# Patient Record
Sex: MALE | Race: BLACK OR AFRICAN AMERICAN | Employment: OTHER | URBAN - METROPOLITAN AREA
[De-identification: names, ages, dates, MRNs, and addresses within clinical notes are randomized per-mention and may not be internally consistent; named-entity substitution may affect disease eponyms.]

---

## 2017-03-07 ENCOUNTER — OFFICE VISIT (OUTPATIENT)
Dept: FAMILY MEDICINE CLINIC | Age: 67
End: 2017-03-07

## 2017-03-07 DIAGNOSIS — N18.2 CKD (CHRONIC KIDNEY DISEASE) STAGE 2, GFR 60-89 ML/MIN: ICD-10-CM

## 2017-03-07 DIAGNOSIS — E11.21 TYPE 2 DIABETES MELLITUS WITH DIABETIC NEPHROPATHY, WITH LONG-TERM CURRENT USE OF INSULIN (HCC): Primary | ICD-10-CM

## 2017-03-07 DIAGNOSIS — E78.2 MIXED HYPERLIPIDEMIA: ICD-10-CM

## 2017-03-07 DIAGNOSIS — Z79.4 TYPE 2 DIABETES MELLITUS WITH DIABETIC NEPHROPATHY, WITH LONG-TERM CURRENT USE OF INSULIN (HCC): Primary | ICD-10-CM

## 2017-03-07 DIAGNOSIS — I10 ESSENTIAL HYPERTENSION, BENIGN: ICD-10-CM

## 2017-03-14 RX ORDER — BLOOD SUGAR DIAGNOSTIC
STRIP MISCELLANEOUS
Qty: 300 STRIP | Refills: 0 | Status: SHIPPED | OUTPATIENT
Start: 2017-03-14

## 2017-04-17 ENCOUNTER — HOSPITAL ENCOUNTER (OUTPATIENT)
Dept: LAB | Age: 67
Discharge: HOME | End: 2017-04-17
Payer: MEDICARE

## 2017-04-17 DIAGNOSIS — E11.29 TYPE II DIABETES MELLITUS WITH RENAL MANIFESTATIONS (HCC): ICD-10-CM

## 2017-04-17 LAB
ALBUMIN SERPL BCP-MCNC: 3.6 G/DL (ref 3.4–5)
ANION GAP BLD CALC-SCNC: 8 MMOL/L (ref 3–18)
BUN SERPL-MCNC: 24 MG/DL (ref 7–18)
BUN/CREAT SERPL: 14 (ref 12–20)
CALCIUM SERPL-MCNC: 8.8 MG/DL (ref 8.5–10.1)
CALCIUM SERPL-MCNC: 9.1 MG/DL (ref 8.5–10.1)
CHLORIDE SERPL-SCNC: 97 MMOL/L (ref 100–108)
CO2 SERPL-SCNC: 31 MMOL/L (ref 21–32)
CREAT SERPL-MCNC: 1.69 MG/DL (ref 0.6–1.3)
CREAT UR-MCNC: 91.6 MG/DL (ref 30–125)
GLUCOSE SERPL-MCNC: 188 MG/DL (ref 74–99)
PHOSPHATE SERPL-MCNC: 3.5 MG/DL (ref 2.5–4.9)
POTASSIUM SERPL-SCNC: 3.7 MMOL/L (ref 3.5–5.5)
PROT UR-MCNC: 9 MG/DL
PTH-INTACT SERPL-MCNC: 73.9 PG/ML (ref 14–72)
SODIUM SERPL-SCNC: 136 MMOL/L (ref 136–145)

## 2017-04-17 PROCEDURE — 82570 ASSAY OF URINE CREATININE: CPT | Performed by: INTERNAL MEDICINE

## 2017-04-17 PROCEDURE — 83970 ASSAY OF PARATHORMONE: CPT | Performed by: INTERNAL MEDICINE

## 2017-04-17 PROCEDURE — 84156 ASSAY OF PROTEIN URINE: CPT | Performed by: INTERNAL MEDICINE

## 2017-04-17 PROCEDURE — 36415 COLL VENOUS BLD VENIPUNCTURE: CPT | Performed by: INTERNAL MEDICINE

## 2017-04-17 PROCEDURE — 80069 RENAL FUNCTION PANEL: CPT | Performed by: INTERNAL MEDICINE

## 2017-06-12 ENCOUNTER — OFFICE VISIT (OUTPATIENT)
Dept: FAMILY MEDICINE CLINIC | Age: 67
End: 2017-06-12

## 2017-06-12 ENCOUNTER — PATIENT OUTREACH (OUTPATIENT)
Dept: FAMILY MEDICINE CLINIC | Age: 67
End: 2017-06-12

## 2017-06-12 VITALS
TEMPERATURE: 98.5 F | WEIGHT: 242.4 LBS | DIASTOLIC BLOOD PRESSURE: 86 MMHG | BODY MASS INDEX: 30.14 KG/M2 | SYSTOLIC BLOOD PRESSURE: 136 MMHG | RESPIRATION RATE: 26 BRPM | HEIGHT: 75 IN | OXYGEN SATURATION: 97 % | HEART RATE: 71 BPM

## 2017-06-12 DIAGNOSIS — E11.21 TYPE 2 DIABETES MELLITUS WITH DIABETIC NEPHROPATHY, WITH LONG-TERM CURRENT USE OF INSULIN (HCC): ICD-10-CM

## 2017-06-12 DIAGNOSIS — I10 ESSENTIAL HYPERTENSION, BENIGN: Primary | ICD-10-CM

## 2017-06-12 DIAGNOSIS — R97.20 ELEVATED PSA: ICD-10-CM

## 2017-06-12 DIAGNOSIS — Z00.00 ROUTINE GENERAL MEDICAL EXAMINATION AT A HEALTH CARE FACILITY: ICD-10-CM

## 2017-06-12 DIAGNOSIS — N52.9 ERECTILE DYSFUNCTION, UNSPECIFIED ERECTILE DYSFUNCTION TYPE: ICD-10-CM

## 2017-06-12 DIAGNOSIS — Z13.39 SCREENING FOR ALCOHOLISM: ICD-10-CM

## 2017-06-12 DIAGNOSIS — E78.2 MIXED HYPERLIPIDEMIA: ICD-10-CM

## 2017-06-12 DIAGNOSIS — N18.2 CKD (CHRONIC KIDNEY DISEASE) STAGE 2, GFR 60-89 ML/MIN: ICD-10-CM

## 2017-06-12 DIAGNOSIS — Z79.4 TYPE 2 DIABETES MELLITUS WITH DIABETIC NEPHROPATHY, WITH LONG-TERM CURRENT USE OF INSULIN (HCC): ICD-10-CM

## 2017-06-12 RX ORDER — TADALAFIL 20 MG/1
20 TABLET ORAL AS NEEDED
Qty: 6 TAB | Refills: 10 | Status: SHIPPED | OUTPATIENT
Start: 2017-06-12 | End: 2018-01-12 | Stop reason: SDUPTHER

## 2017-06-12 RX ORDER — CARVEDILOL 12.5 MG/1
TABLET ORAL
Refills: 0 | COMMUNITY
Start: 2017-04-26 | End: 2020-10-02

## 2017-06-12 RX ORDER — PHENTERMINE HYDROCHLORIDE 15 MG/1
CAPSULE ORAL
Refills: 3 | COMMUNITY
Start: 2017-05-30 | End: 2018-05-17 | Stop reason: ALTCHOICE

## 2017-06-12 NOTE — PROGRESS NOTES
This is a Subsequent Medicare Annual Wellness Visit providing Personalized Prevention Plan Services (PPPS) (Performed 12 months after initial AWV and PPPS )    I have reviewed the patient's medical history in detail and updated the computerized patient record. History     Past Medical History:   Diagnosis Date    Allergic rhinitis, cause unspecified 2/12/2013    CKD (chronic kidney disease) stage 2, GFR 60-89 ml/min 11/3/2016    Dysuria     ED (erectile dysfunction) 12/2/2011    Elevated PSA     benign PNBx 2012, 1/6/2016    Encounter for long-term (current) use of other medications 1/9/2011    Essential hypertension, benign 1/9/2011    Gross hematuria     Hemorrhagic cystitis     Mixed hyperlipidemia 1/9/2011    Nocturia 10/10/2011    OAB (overactive bladder)     Orchitis     Type II or unspecified type diabetes mellitus without mention of complication, not stated as uncontrolled 1/9/2011    UTI (urinary tract infection)       Past Surgical History:   Procedure Laterality Date    HX HEMORRHOIDECTOMY      HX HERNIA REPAIR      HX ORTHOPAEDIC      HX UROLOGICAL  05/15/12    [facility], PNBx - TRUS Vol 23.22 cc's, Benign, Dr Lukasz Worthington  1/6/16    PNBx-TRUS VOL 20.06cc's, Benign, pre-Bx PSA 10.07, Dr. Tyler Joiner     Current Outpatient Prescriptions   Medication Sig Dispense Refill    carvedilol (COREG) 12.5 mg tablet TK 1 T PO  BID  0    phentermine (ADIPEX_P) 15 mg capsule TK 1 C PO ONCE QD  3    tadalafil (CIALIS) 20 mg tablet Take 1 Tab by mouth as needed. 6 Tab 10    ONETOUCH ULTRA TEST strip TEST FOUR TIMES DAILY AS DIRECTED 300 Strip 0    chlorthalidone (HYGROTEN) 25 mg tablet TK 1 T PO D  11    glucose blood VI test strips (ONETOUCH ULTRA TEST) strip by Does Not Apply route See Admin Instructions.  Dx. Code E11.9 300 Strip 3    NOVOLOG MIX 70-30 FLEXPEN 100 unit/mL (70-30) inpn       SRIDEVI PEN NEEDLE 32 gauge x 5/32\" ndle   6    quinapril (ACCUPRIL) 40 mg tablet Take 2 Tabs by mouth daily. (Patient taking differently: Take 40 mg by mouth daily.) 60 Tab 11    esomeprazole (NEXIUM) 20 mg capsule Take  by mouth daily.  Blood-Glucose Meter monitoring kit Test 4-5 times per day as needed 1 Kit 0    glucose blood VI test strips (BLOOD GLUCOSE TEST) strip Test 4-5 times per day as needed 150 Strip 11    COMBIGAN 0.2-0.5 % drop ophthalmic solution   3    triamcinolone acetonide (KENALOG) 0.1 % topical cream Apply  to affected area two (2) times a day. use thin layer 85 g 1     No Known Allergies  Family History   Problem Relation Age of Onset    Diabetes Mother     Cancer Father      Social History   Substance Use Topics    Smoking status: Former Smoker    Smokeless tobacco: Not on file    Alcohol use No     Patient Active Problem List   Diagnosis Code    Essential hypertension, benign I10    Mixed hyperlipidemia E78.2    Encounter for long-term (current) use of other medications Z79.899    Nocturia R35.1    ED (erectile dysfunction) N52.9    Allergic rhinitis, cause unspecified J30.9    Elevated PSA R97.20    OAB (overactive bladder) N32.81    Type 2 diabetes mellitus with diabetic nephropathy, with long-term current use of insulin (HCC) E11.21, Z79.4    CKD (chronic kidney disease) stage 2, GFR 60-89 ml/min N18.2       Depression Risk Factor Screening:     PHQ over the last two weeks 11/3/2016   Little interest or pleasure in doing things Not at all   Feeling down, depressed or hopeless Not at all   Total Score PHQ 2 0     Alcohol Risk Factor Screening: On any occasion during the past 3 months, have you had more than 4 drinks containing alcohol? No    Do you average more than 14 drinks per week? No      Functional Ability and Level of Safety:     Hearing Loss   mild-to-moderate    Activities of Daily Living   Self-care. Requires assistance with: no ADLs    Fall Risk     Fall Risk Assessment, last 12 mths 11/3/2016   Able to walk?  Yes   Fall in past 15 months? No     Abuse Screen   Patient is not abused    Review of Systems   Not required    Physical Examination     Evaluation of Cognitive Function:  Mood/affect:  neutral  Appearance: age appropriate  Family member/caregiver input: here alone    No exam performed today, done by Dr David Ashley. Patient Care Team:  Marcellus Cole MD as PCP - WESLEY Turcios MD (Ophthalmology)   Tapan Owen RN Nurse Navigator    Advice/Referrals/Counseling   Education and counseling provided:  End-of-Life planning (with patient's consent) Advanced Directives discussed with patient, given Your Right to Decide booklet and Advanced Directive paperwork to completed and bring back for chart. Assessment/Plan   As directed by Dr David Ashley.

## 2017-06-12 NOTE — PATIENT INSTRUCTIONS
Medicare Part B Preventive Services Limitations Recommendation Scheduled   Bone Mass Measurement  (age 72 & older, biennial) Requires diagnosis related to osteoporosis or estrogen deficiency. Biennial benefit unless patient has history of long-term glucocorticoid tx or baseline is needed because initial test was by other method  N/A   Cardiovascular Screening Blood Tests (every 5 years)  Total cholesterol, HDL, Triglycerides Order as a panel if possible  Last done 11/2016   Colorectal Cancer Screening  -Fecal occult blood test (annual)  -Flexible sigmoidoscopy (5y)  -Screening colonoscopy (10y)  -Barium Enema   Referred last visit   Counseling to Prevent Tobacco Use (up to 8 sessions per year)  - Counseling greater than 3 and up to 10 minutes  - Counseling greater than 10 minutes Patients must be asymptomatic of tobacco-related conditions to receive as preventive service  Non smoker   Diabetes Screening Tests (at least every 3 years, Medicare covers annually or at 6-month intervals for prediabetic patients)    Fasting blood sugar (FBS) or glucose tolerance test (GTT) Patient must be diagnosed with one of the following:  -Hypertension, Dyslipidemia, obesity, previous impaired FBS or GTT  Or any two of the following: overweight, FH of diabetes, age ? 72, history of gestational diabetes, birth of baby weighing more than 9 pounds  Last one 5/2017 7.9   Diabetes Self-Management Training (DSMT) (no USPSTF recommendation) Requires referral by treating physician for patient with diabetes or renal disease. 10 hours of initial DSMT session of no less than 30 minutes each in a continuous 12-month period. 2 hours of follow-up DSMT in subsequent years.   N/A   Glaucoma Screening (no USPSTF recommendation) Diabetes mellitus, family history, , age 48 or over,  American, age 72 or over  Eye surgery with Dr Christa Boss this week   Human Immunodeficiency Virus (HIV) Screening (annually for increased risk patients)  HIV-1 and HIV-2 by EIA, MAYITO, rapid antibody test, or oral mucosa transudate Patient must be at increased risk for HIV infection per USPSTF guidelines or pregnant. Tests covered annually for patients at increased risk. Pregnant patients may receive up to 3 test during pregnancy. Not high risk   Medical Nutrition Therapy (MNT) (for diabetes or renal disease not recommended schedule) Requires referral by treating physician for patient with diabetes or renal disease. Can be provided in same year as diabetes self-management training (DSMT), and CMS recommends medical nutrition therapy take place after DSMT. Up to 3 hours for initial year and 2 hours in subsequent years. N/A   Prostate Cancer Screening (annually up to age 76)  - Digital rectal exam (AVA)  - Prostate specific antigen (PSA) Annually (age 48 or over), AVA not paid separately when covered E/M service is provided on same date  Men up to age 76 may need a screening blood test for prostate cancer at certain intervals, depending on their personal and family history. This decision is between the patient and his provider. Last PSA was 10.07 on 12/2015 being followed by Fadi Alexander  Urology   Seasonal Influenza Vaccination (annually)   Fall 2017     Pneumococcal Vaccination (once after 72)      Hepatitis B Vaccinations (if medium/high risk) Medium/high risk factors:  End-stage renal disease,  Hemophiliacs who received Factor VIII or IX concentrates, Clients of institutions for the mentally retarded, Persons who live in the same house as a HepB virus carrier, Homosexual men, Illicit injectable drug abusers. Done 3/2016   Shingles Vaccination A shingles vaccine is also recommended once in a lifetime after age 61  Declined today   Ultrasound Screening for Abdominal Aortic Aneurysm (AAA) (once) Patient must be referred through [facility] and not have had a screening for abdominal aortic aneurysm before under Medicare.   Limited to patients who meet one of the following criteria:  - Men who are 73-68 years old and have smoked more than 100 cigarettes in their lifetime.  -Anyone with a FH of AAA  -Anyone recommended for screening by USPSTF  N/A

## 2017-06-12 NOTE — MR AVS SNAPSHOT
Visit Information Date & Time Provider Department Dept. Phone Encounter #  
 6/12/2017  2:00 PM [facility] [phone] [id number] Follow-up Instructions Return in about 3 months (around 9/12/2017). Upcoming Health Maintenance Date Due FOBT Q 1 YEAR AGE 50-75 4/17/2000 ZOSTER VACCINE AGE 60> 4/17/2010 EYE EXAM RETINAL OR DILATED Q1 7/22/2016 MEDICARE YEARLY EXAM 3/12/2017 FOOT EXAM Q1 7/5/2017 GLAUCOMA SCREENING Q2Y 7/22/2017 INFLUENZA AGE 9 TO ADULT 8/1/2017 HEMOGLOBIN A1C Q6M 9/1/2017 Pneumococcal 65+ Low/Medium Risk (2 of 2 - PPSV23) 10/11/2017 LIPID PANEL Q1 3/1/2018 MICROALBUMIN Q1 3/3/2018 DTaP/Tdap/Td series (2 - Td) 7/5/2026 Allergies as of 6/12/2017  Review Complete On: 6/12/2017 By: Maycol Youssef No Known Allergies Current Immunizations  Reviewed on 11/3/2016 Name Date Influenza High Dose Vaccine PF 11/3/2016 Influenza Vaccine Split 10/11/2012 Pneumococcal Conjugate (PCV-13) 7/30/2015 Pneumococcal Vaccine (Unspecified Type) 10/11/2012 Not reviewed this visit You Were Diagnosed With   
  
 Codes Comments Essential hypertension, benign    -  Primary ICD-10-CM: I10 
ICD-9-CM: 401.1 Type 2 diabetes mellitus with diabetic nephropathy, with long-term current use of insulin (HCC)     ICD-10-CM: E11.21, Z79.4 ICD-9-CM: 250.40, 583.81, V58.67 Elevated PSA     ICD-10-CM: R97.20 ICD-9-CM: 790.93   
 CKD (chronic kidney disease) stage 2, GFR 60-89 ml/min     ICD-10-CM: N18.2 ICD-9-CM: 509. 2 Mixed hyperlipidemia     ICD-10-CM: E78.2 ICD-9-CM: 272.2 Erectile dysfunction, unspecified erectile dysfunction type     ICD-10-CM: N52.9 ICD-9-CM: 607.84 Vitals BP Pulse Temp Resp Height(growth percentile) Weight(growth percentile)  136/86 (BP 1 Location: Left arm, BP Patient Position: Sitting) 71 98.5 °F (36.9 °C) (Oral) 26 6' 2.5\" (1.892 m) 242 lb 6.4 oz (110 kg) SpO2 BMI Smoking Status 97% 30.71 kg/m2 Former Smoker Vitals History BMI and BSA Data Body Mass Index Body Surface Area 30.71 kg/m 2 2.4 m 2 Preferred Pharmacy Pharmacy Name Phone [facility] [address], [address] [address] [phone] Your Updated Medication List  
  
   
This list is accurate as of: 6/12/17  2:23 PM.  Always use your most recent med list.  
  
  
  
  
 Blood-Glucose Meter monitoring kit Test 4-5 times per day as needed  
  
 carvedilol 12.5 mg tablet Commonly known as:  COREG  
TK 1 T PO  BID  
  
 chlorthalidone 25 mg tablet Commonly known as:  HYGROTEN  
TK 1 T PO D  
  
 COMBIGAN 0.2-0.5 % Drop ophthalmic solution Generic drug:  brimonidine-timolol * glucose blood VI test strips strip Commonly known as:  blood glucose test  
Test 4-5 times per day as needed * glucose blood VI test strips strip Commonly known as:  ONETOUCH ULTRA TEST  
by Does Not Apply route See Admin Instructions. Dx. Code E11.9  
  
 * ONETOUCH ULTRA TEST strip Generic drug:  glucose blood VI test strips TEST FOUR TIMES DAILY AS DIRECTED Marielena Pen Needle 32 gauge x 5/32\" Ndle Generic drug:  Insulin Needles (Disposable) NexIUM 20 mg capsule Generic drug:  esomeprazole Take  by mouth daily. NovoLOG Mix 70-30 FlexPen 100 unit/mL (70-30) Inpn Generic drug:  insulin aspart protamine/insulin aspart  
  
 phentermine 15 mg capsule Commonly known as:  ADIPEX_P TK 1 C PO ONCE QD  
  
 quinapril 40 mg tablet Commonly known as:  ACCUPRIL Take 2 Tabs by mouth daily. tadalafil 20 mg tablet Commonly known as:  CIALIS Take 1 Tab by mouth as needed. triamcinolone acetonide 0.1 % topical cream  
Commonly known as:  KENALOG Apply  to affected area two (2) times a day. use thin layer * Notice: This list has 3 medication(s) that are the same as other medications prescribed for you. Read the directions carefully, and ask your doctor or other care provider to review them with you. Prescriptions Printed Refills  
 tadalafil (CIALIS) 20 mg tablet 10 Sig: Take 1 Tab by mouth as needed. Class: Print Route: Oral  
  
We Performed the Following AMB POC GLUCOSE, QUANTITATIVE, BLOOD [19073 CPT(R)] METABOLIC PANEL, COMPREHENSIVE [72948 CPT(R)] PROSTATE SPECIFIC AG (PSA) [id number] CPT(R)] Follow-up Instructions Return in about 3 months (around 9/12/2017). Introducing [facility] & HEALTH SERVICES! Dear Declan Cifuentes: 
Thank you for requesting a BuildOut account. Our records indicate that you have previously registered for a BuildOut account but its currently inactive. Please call our BuildOut support line at [phone]. Additional Information If you have questions, please visit the Frequently Asked Questions section of the BuildOut website at [email or web address] Remember, BuildOut is NOT to be used for urgent needs. For medical emergencies, dial 911. Now available from your iPhone and Android! Please provide this summary of care documentation to your next provider. Your primary care clinician is listed as Cindi Zuniga. If you have any questions after today's visit, please call [phone].

## 2017-06-12 NOTE — PROGRESS NOTES
HISTORY OF PRESENT ILLNESS  Neha Brenner is a 79 y.o. male. f/u hbp,ckd,dm2. Folowed by endo and renal.Latest AIC was 7.9,lipids good  Hypertension    The history is provided by the patient. This is a chronic problem. The problem has not changed since onset. Pertinent negatives include no orthopnea, no malaise/fatigue, no peripheral edema, no dizziness and no shortness of breath. Diabetes   This is a chronic problem. The problem occurs daily. The problem has not changed since onset. Pertinent negatives include no shortness of breath. Cholesterol Problem   This is a chronic problem. The problem occurs daily. The problem has not changed since onset. Pertinent negatives include no shortness of breath. Review of Systems   Constitutional: Negative for fever, malaise/fatigue and weight loss. Respiratory: Negative for shortness of breath. Cardiovascular: Negative for orthopnea and claudication. Genitourinary: Negative for dysuria, frequency and urgency. Skin: Negative for rash. Neurological: Negative for dizziness. Physical Exam   Constitutional: He appears well-developed and well-nourished. HENT:   Head: Normocephalic and atraumatic. Right Ear: External ear normal.   Left Ear: External ear normal.   Nose: Nose normal.   Mouth/Throat: Oropharynx is clear and moist.   Eyes: Conjunctivae are normal. Pupils are equal, round, and reactive to light. Neck: Normal range of motion. Neck supple. No tracheal deviation present. No thyromegaly present. Cardiovascular: Normal rate, regular rhythm and normal heart sounds. Exam reveals no gallop. No murmur heard. Pulmonary/Chest: Effort normal and breath sounds normal. No respiratory distress. He has no wheezes. Abdominal: Soft. Bowel sounds are normal. He exhibits no distension. Lymphadenopathy:     He has no cervical adenopathy. Neurological: He is alert. Skin: Skin is warm and dry. Psychiatric: He has a normal mood and affect.    Vitals reviewed. ASSESSMENT and Reynaanshu Riggins was seen today for diabetes, hypertension, cholesterol problem, mole, shortness of breath and annual wellness visit. Diagnoses and all orders for this visit:    Essential hypertension, benign  -     METABOLIC PANEL, COMPREHENSIVE    Type 2 diabetes mellitus with diabetic nephropathy, with long-term current use of insulin ([facility]),followed by Endo,doing well  -     AMB POC GLUCOSE, QUANTITATIVE, BLOOD    Elevated PSA  -     PROSTATE SPECIFIC AG    CKD (chronic kidney disease) stage 2, GFR 60-89 ml/min,followed by Renal    Mixed hyperlipidemia    Erectile dysfunction, unspecified erectile dysfunction type  -     tadalafil (CIALIS) 20 mg tablet; Take 1 Tab by mouth as needed. Routine general medical examination at a health care facility    Screening for alcoholism    Doing well,continue current meds and treatments    Follow-up Disposition:  Return in about 3 months (around 9/12/2017).

## 2017-06-12 NOTE — PROGRESS NOTES
Chief Complaint   Patient presents with    Diabetes     F/U on diabetes.  Hypertension     F/U on BP.  Cholesterol Problem     F/U on cholesterol.  Mole     Pt getting mole removed for R breast.    Shortness of Breath     Pt statee he has SOB and wheezing and requesting a chest X-Ray.

## 2017-11-28 ENCOUNTER — HOSPITAL ENCOUNTER (OUTPATIENT)
Dept: LAB | Age: 67
Discharge: HOME | End: 2017-11-28
Payer: MEDICARE

## 2017-11-28 LAB
ALBUMIN SERPL-MCNC: 3.8 G/DL (ref 3.4–5)
ALBUMIN/GLOB SERPL: 0.9 {RATIO} (ref 0.8–1.7)
ALP SERPL-CCNC: 81 U/L (ref 45–117)
ALT SERPL-CCNC: 32 U/L (ref 16–61)
AST SERPL-CCNC: 25 U/L (ref 15–37)
BILIRUB DIRECT SERPL-MCNC: 0.1 MG/DL (ref 0–0.2)
BILIRUB SERPL-MCNC: 0.3 MG/DL (ref 0.2–1)
GLOBULIN SER CALC-MCNC: 4.1 G/DL (ref 2–4)
PROT SERPL-MCNC: 7.9 G/DL (ref 6.4–8.2)

## 2017-11-28 PROCEDURE — 36415 COLL VENOUS BLD VENIPUNCTURE: CPT | Performed by: INTERNAL MEDICINE

## 2017-11-28 PROCEDURE — 80076 HEPATIC FUNCTION PANEL: CPT | Performed by: INTERNAL MEDICINE

## 2017-12-26 ENCOUNTER — HOSPITAL ENCOUNTER (OUTPATIENT)
Dept: LAB | Age: 67
Discharge: HOME | End: 2017-12-26
Payer: MEDICARE

## 2017-12-26 DIAGNOSIS — E11.29 TYPE II OR UNSPECIFIED TYPE DIABETES MELLITUS WITH RENAL MANIFESTATIONS, NOT STATED AS UNCONTROLLED(250.40) (HCC): ICD-10-CM

## 2017-12-26 LAB
ALBUMIN SERPL-MCNC: 3.9 G/DL (ref 3.4–5)
ANION GAP SERPL CALC-SCNC: 6 MMOL/L (ref 3–18)
BUN SERPL-MCNC: 20 MG/DL (ref 7–18)
BUN/CREAT SERPL: 13 (ref 12–20)
CALCIUM SERPL-MCNC: 8.7 MG/DL (ref 8.5–10.1)
CHLORIDE SERPL-SCNC: 96 MMOL/L (ref 100–108)
CO2 SERPL-SCNC: 33 MMOL/L (ref 21–32)
CREAT SERPL-MCNC: 1.55 MG/DL (ref 0.6–1.3)
GLUCOSE SERPL-MCNC: 187 MG/DL (ref 74–99)
PHOSPHATE SERPL-MCNC: 3.2 MG/DL (ref 2.5–4.9)
POTASSIUM SERPL-SCNC: 3.6 MMOL/L (ref 3.5–5.5)
SODIUM SERPL-SCNC: 135 MMOL/L (ref 136–145)

## 2017-12-26 PROCEDURE — 80069 RENAL FUNCTION PANEL: CPT | Performed by: INTERNAL MEDICINE

## 2017-12-26 PROCEDURE — 36415 COLL VENOUS BLD VENIPUNCTURE: CPT | Performed by: INTERNAL MEDICINE

## 2018-01-12 ENCOUNTER — OFFICE VISIT (OUTPATIENT)
Dept: FAMILY MEDICINE CLINIC | Age: 68
End: 2018-01-12

## 2018-01-12 VITALS
WEIGHT: 237.6 LBS | DIASTOLIC BLOOD PRESSURE: 88 MMHG | HEIGHT: 75 IN | BODY MASS INDEX: 29.54 KG/M2 | SYSTOLIC BLOOD PRESSURE: 128 MMHG | HEART RATE: 69 BPM | TEMPERATURE: 98.1 F | OXYGEN SATURATION: 99 % | RESPIRATION RATE: 24 BRPM

## 2018-01-12 DIAGNOSIS — Z79.4 TYPE 2 DIABETES MELLITUS WITH DIABETIC NEPHROPATHY, WITH LONG-TERM CURRENT USE OF INSULIN (HCC): Primary | ICD-10-CM

## 2018-01-12 DIAGNOSIS — N52.9 ERECTILE DYSFUNCTION, UNSPECIFIED ERECTILE DYSFUNCTION TYPE: ICD-10-CM

## 2018-01-12 DIAGNOSIS — I10 ESSENTIAL HYPERTENSION, BENIGN: ICD-10-CM

## 2018-01-12 DIAGNOSIS — E11.21 TYPE 2 DIABETES MELLITUS WITH DIABETIC NEPHROPATHY, WITH LONG-TERM CURRENT USE OF INSULIN (HCC): Primary | ICD-10-CM

## 2018-01-12 DIAGNOSIS — N18.2 CKD (CHRONIC KIDNEY DISEASE) STAGE 2, GFR 60-89 ML/MIN: ICD-10-CM

## 2018-01-12 DIAGNOSIS — E78.2 MIXED HYPERLIPIDEMIA: ICD-10-CM

## 2018-01-12 RX ORDER — TADALAFIL 20 MG/1
20 TABLET ORAL AS NEEDED
Qty: 6 TAB | Refills: 10 | Status: SHIPPED | OUTPATIENT
Start: 2018-01-12 | End: 2018-01-12 | Stop reason: CLARIF

## 2018-01-12 RX ORDER — TADALAFIL 20 MG/1
20 TABLET ORAL AS NEEDED
Qty: 6 TAB | Refills: 10 | Status: SHIPPED | OUTPATIENT
Start: 2018-01-12 | End: 2019-08-07 | Stop reason: SDUPTHER

## 2018-01-12 NOTE — MR AVS SNAPSHOT
Visit Information Date & Time Provider Department Dept. Phone Encounter #  
 1/12/2018  9:15 AM [facility] [phone] [id number] Follow-up Instructions Return in about 3 months (around 4/12/2018). Upcoming Health Maintenance Date Due FOBT Q 1 YEAR AGE 50-75 4/17/2000 EYE EXAM RETINAL OR DILATED Q1 7/22/2016 GLAUCOMA SCREENING Q2Y 7/22/2017 Influenza Age 5 to Adult 8/1/2017 MICROALBUMIN Q1 3/3/2018 HEMOGLOBIN A1C Q6M 3/19/2018 MEDICARE YEARLY EXAM 6/13/2018 LIPID PANEL Q1 9/19/2018 FOOT EXAM Q1 1/12/2019 DTaP/Tdap/Td series (2 - Td) 7/5/2026 Allergies as of 1/12/2018  Review Complete On: 1/12/2018 By: Edu Saucedo No Known Allergies Current Immunizations  Reviewed on 11/3/2016 Name Date Influenza High Dose Vaccine PF 11/3/2016 Influenza Vaccine Split 10/11/2012 Pneumococcal Conjugate (PCV-13) 7/30/2015 ZZZ-RETIRED (DO NOT USE) Pneumococcal Vaccine (Unspecified Type) 10/11/2012 Not reviewed this visit You Were Diagnosed With   
  
 Codes Comments Type 2 diabetes mellitus with diabetic nephropathy, with long-term current use of insulin (HCC)    -  Primary ICD-10-CM: E11.21, Z79.4 ICD-9-CM: 250.40, 583.81, V58.67 Essential hypertension, benign     ICD-10-CM: I10 
ICD-9-CM: 401.1 Mixed hyperlipidemia     ICD-10-CM: E78.2 ICD-9-CM: 272.2 CKD (chronic kidney disease) stage 2, GFR 60-89 ml/min     ICD-10-CM: N18.2 ICD-9-CM: 888. 2 Erectile dysfunction, unspecified erectile dysfunction type     ICD-10-CM: N52.9 ICD-9-CM: 607.84 Vitals BP Pulse Temp Resp Height(growth percentile) Weight(growth percentile) 128/88 (BP 1 Location: Right arm, BP Patient Position: Sitting) 69 98.1 °F (36.7 °C) (Oral) 24 6' 2.5\" (1.892 m) 237 lb 9.6 oz (107.8 kg) SpO2 BMI Smoking Status 99% 30.1 kg/m2 Former Smoker Vitals History BMI and BSA Data Body Mass Index Body Surface Area  
 30.1 kg/m 2 2.38 m 2 Preferred Pharmacy Pharmacy Name Phone [facility] [address], [address] [address] [phone] Your Updated Medication List  
  
   
This list is accurate as of: 1/12/18  9:44 AM.  Always use your most recent med list.  
  
  
  
  
 Blood-Glucose Meter monitoring kit Test 4-5 times per day as needed  
  
 carvedilol 12.5 mg tablet Commonly known as:  COREG  
TK 1 T PO  BID  
  
 chlorthalidone 25 mg tablet Commonly known as:  HYGROTEN  
TK 1 T PO D  
  
 COMBIGAN 0.2-0.5 % Drop ophthalmic solution Generic drug:  brimonidine-timolol * glucose blood VI test strips strip Commonly known as:  blood glucose test  
Test 4-5 times per day as needed * glucose blood VI test strips strip Commonly known as:  ONETOUCH ULTRA TEST  
by Does Not Apply route See Admin Instructions. Dx. Code E11.9  
  
 * ONETOUCH ULTRA TEST strip Generic drug:  glucose blood VI test strips TEST FOUR TIMES DAILY AS DIRECTED Marielena Pen Needle 32 gauge x 5/32\" Ndle Generic drug:  Insulin Needles (Disposable) NovoLOG Mix 70-30 FlexPen 100 unit/mL (70-30) Inpn Generic drug:  insulin aspart protamine/insulin aspart  
  
 phentermine 15 mg capsule Commonly known as:  ADIPEX_P TK 1 C PO ONCE QD  
  
 quinapril 40 mg tablet Commonly known as:  ACCUPRIL  
TAKE 2 TABLETS BY MOUTH EVERY DAY  
  
 tadalafil 20 mg tablet Commonly known as:  CIALIS Take 1 Tab by mouth as needed. triamcinolone acetonide 0.1 % topical cream  
Commonly known as:  KENALOG Apply  to affected area two (2) times a day. use thin layer * Notice: This list has 3 medication(s) that are the same as other medications prescribed for you. Read the directions carefully, and ask your doctor or other care provider to review them with you. Prescriptions Printed Refills  
 tadalafil (CIALIS) 20 mg tablet 10 Sig: Take 1 Tab by mouth as needed. Class: Print Route: Oral  
  
Follow-up Instructions Return in about 3 months (around 4/12/2018). Introducing [facility]! Dear Grzegorz Hall: 
Thank you for requesting a Swipely account. Our records indicate that you have previously registered for a Swipely account but its currently inactive. Please call our Swipely support line at [phone]. Additional Information If you have questions, please visit the Frequently Asked Questions section of the Swipely website at [email or web address] Remember, Swipely is NOT to be used for urgent needs. For medical emergencies, dial 911. Now available from your iPhone and Android! Please provide this summary of care documentation to your next provider. Your primary care clinician is listed as Gallito Hensley. If you have any questions after today's visit, please call [phone].

## 2018-01-12 NOTE — PROGRESS NOTES
Chief Complaint   Patient presents with    Diabetes     F/u on diabetes.  Hypertension     F/U on BP.  Cholesterol Problem     F/U on cholesterol.

## 2018-01-14 NOTE — PROGRESS NOTES
HISTORY OF PRESENT ILLNESS  Efrain Hope is a 79 y.o. male. F/U DM2 ,now on  Bid insulin per Endo with improving control. Followed by Renal,stable ckd  Diabetes  The history is provided by the patient. This is a chronic problem. The problem occurs daily. The problem has not changed since onset. Pertinent negatives include no chest pain and no shortness of breath. Hypertension   This is a chronic problem. The problem has not changed since onset. Associated symptoms include malaise/fatigue and peripheral edema. Pertinent negatives include no chest pain, no orthopnea and no shortness of breath. Cholesterol Problem  This is a chronic problem. The problem occurs daily. Pertinent negatives include no chest pain and no shortness of breath. Review of Systems   Constitutional: Positive for malaise/fatigue. Negative for fever. Respiratory: Negative for cough and shortness of breath. Cardiovascular: Positive for leg swelling. Negative for chest pain and orthopnea. Physical Exam   Constitutional: He appears well-nourished. HENT:   Head: Normocephalic and atraumatic. Right Ear: External ear normal.   Left Ear: External ear normal.   Mouth/Throat: Oropharynx is clear and moist.   Eyes: Conjunctivae are normal. Pupils are equal, round, and reactive to light. Neck: Normal range of motion. Neck supple. Pulmonary/Chest: Effort normal and breath sounds normal.   Abdominal: Soft. Bowel sounds are normal.   Skin: Skin is warm. Diagnoses and all orders for this visit:    1. Type 2 diabetes mellitus with diabetic nephropathy, with long-term current use of insulin ([address]    2. Essential hypertension, benign    3. Mixed hyperlipidemia    4. CKD (chronic kidney disease) stage 2, GFR 60-89 ml/min    5. Erectile dysfunction, unspecified erectile dysfunction type  -     tadalafil (CIALIS) 20 mg tablet; Take 1 Tab by mouth as needed. Follow-up Disposition:  Return in about 3 months (around 4/12/2018).       To F/U with Endo  Follow-up Disposition:  Return in about 3 months (around 4/12/2018).

## 2018-04-11 ENCOUNTER — HOSPITAL ENCOUNTER (EMERGENCY)
Age: 68
Discharge: HOME | End: 2018-04-12
Attending: EMERGENCY MEDICINE
Payer: MEDICARE

## 2018-04-11 VITALS
TEMPERATURE: 98.2 F | HEART RATE: 81 BPM | OXYGEN SATURATION: 100 % | SYSTOLIC BLOOD PRESSURE: 181 MMHG | DIASTOLIC BLOOD PRESSURE: 103 MMHG | RESPIRATION RATE: 18 BRPM

## 2018-04-11 DIAGNOSIS — N18.2 STAGE 2 CHRONIC KIDNEY DISEASE: ICD-10-CM

## 2018-04-11 LAB — GLUCOSE BLD STRIP.AUTO-MCNC: 180 MG/DL (ref 70–110)

## 2018-04-11 PROCEDURE — 80053 COMPREHEN METABOLIC PANEL: CPT | Performed by: EMERGENCY MEDICINE

## 2018-04-11 PROCEDURE — 82962 GLUCOSE BLOOD TEST: CPT

## 2018-04-11 PROCEDURE — 85025 COMPLETE CBC W/AUTO DIFF WBC: CPT | Performed by: EMERGENCY MEDICINE

## 2018-04-11 PROCEDURE — 99284 EMERGENCY DEPT VISIT MOD MDM: CPT

## 2018-04-12 LAB
ALBUMIN SERPL-MCNC: 4.2 G/DL (ref 3.4–5)
ALBUMIN/GLOB SERPL: 1.1 {RATIO} (ref 0.8–1.7)
ALP SERPL-CCNC: 88 U/L (ref 45–117)
ALT SERPL-CCNC: 36 U/L (ref 16–61)
ANION GAP SERPL CALC-SCNC: 8 MMOL/L (ref 3–18)
AST SERPL-CCNC: 38 U/L (ref 15–37)
BASOPHILS # BLD: 0 K/UL (ref 0–0.06)
BASOPHILS NFR BLD: 0 % (ref 0–2)
BILIRUB SERPL-MCNC: 0.3 MG/DL (ref 0.2–1)
BUN SERPL-MCNC: 22 MG/DL (ref 7–18)
BUN/CREAT SERPL: 13 (ref 12–20)
CALCIUM SERPL-MCNC: 9.1 MG/DL (ref 8.5–10.1)
CHLORIDE SERPL-SCNC: 98 MMOL/L (ref 100–108)
CO2 SERPL-SCNC: 29 MMOL/L (ref 21–32)
CREAT SERPL-MCNC: 1.7 MG/DL (ref 0.6–1.3)
DIFFERENTIAL METHOD BLD: ABNORMAL
EOSINOPHIL # BLD: 0.2 K/UL (ref 0–0.4)
EOSINOPHIL NFR BLD: 3 % (ref 0–5)
ERYTHROCYTE [DISTWIDTH] IN BLOOD BY AUTOMATED COUNT: 12.9 % (ref 11.6–14.5)
GLOBULIN SER CALC-MCNC: 4 G/DL (ref 2–4)
GLUCOSE BLD STRIP.AUTO-MCNC: 234 MG/DL (ref 70–110)
GLUCOSE SERPL-MCNC: 156 MG/DL (ref 74–99)
HCT VFR BLD AUTO: 39 % (ref 36–48)
HGB BLD-MCNC: 13.7 G/DL (ref 13–16)
LYMPHOCYTES # BLD: 2.1 K/UL (ref 0.9–3.6)
LYMPHOCYTES NFR BLD: 26 % (ref 21–52)
MCH RBC QN AUTO: 28.7 PG (ref 24–34)
MCHC RBC AUTO-ENTMCNC: 35.1 G/DL (ref 31–37)
MCV RBC AUTO: 81.6 FL (ref 74–97)
MONOCYTES # BLD: 0.7 K/UL (ref 0.05–1.2)
MONOCYTES NFR BLD: 9 % (ref 3–10)
NEUTS SEG # BLD: 5 K/UL (ref 1.8–8)
NEUTS SEG NFR BLD: 62 % (ref 40–73)
PLATELET # BLD AUTO: 185 K/UL (ref 135–420)
PMV BLD AUTO: 9 FL (ref 9.2–11.8)
POTASSIUM SERPL-SCNC: 3.3 MMOL/L (ref 3.5–5.5)
PROT SERPL-MCNC: 8.2 G/DL (ref 6.4–8.2)
RBC # BLD AUTO: 4.78 M/UL (ref 4.7–5.5)
SODIUM SERPL-SCNC: 135 MMOL/L (ref 136–145)
WBC # BLD AUTO: 8.1 K/UL (ref 4.6–13.2)

## 2018-04-12 PROCEDURE — 82962 GLUCOSE BLOOD TEST: CPT

## 2018-04-12 NOTE — ED TRIAGE NOTES
\"My blood sugar dropped from 134 at 10:30 and now its 120. \" Pt states this is low for him and it will keep continue dropping.

## 2018-04-12 NOTE — ED PROVIDER NOTES
HPI Comments: Kori Montalvo is a 79 y.o. Male with h/o iddm who states his blood sugar was low for him tonight of 120 after eating and taking his insulin and was concerned it was too low. Denies any nvd, syncope, shakiness. No change in insulin regimen, appetite. Sx are better now. The history is provided by the patient. Past Medical History:   Diagnosis Date    Allergic rhinitis, cause unspecified 2/12/2013    CKD (chronic kidney disease) stage 2, GFR 60-89 ml/min 11/3/2016    Dysuria     ED (erectile dysfunction) 12/2/2011    Elevated PSA     benign PNBx 2012, 1/6/2016    Encounter for long-term (current) use of other medications 1/9/2011    Essential hypertension, benign 1/9/2011    Gross hematuria     Hemorrhagic cystitis     Mixed hyperlipidemia 1/9/2011    Nocturia 10/10/2011    OAB (overactive bladder)     Orchitis     Type II or unspecified type diabetes mellitus without mention of complication, not stated as uncontrolled 1/9/2011    UTI (urinary tract infection)        Past Surgical History:   Procedure Laterality Date    HX HEMORRHOIDECTOMY      HX HERNIA REPAIR      HX ORTHOPAEDIC      HX UROLOGICAL  05/15/12    [facility], PNBx - TRUS Vol 23.22 cc's, Benign, Dr Nicola Shields HX UROLOGICAL  1/6/16    PNBx-TRUS VOL 20.06cc's, Benign, pre-Bx PSA 10.07, Dr. Antelmo Cleary         Family History:   Problem Relation Age of Onset    Diabetes Mother     Cancer Father        Social History     Social History    Marital status:      Spouse name: N/A    Number of children: N/A    Years of education: N/A     Occupational History    Not on file. Social History Main Topics    Smoking status: Former Smoker    Smokeless tobacco: Never Used    Alcohol use No    Drug use: Not on file    Sexual activity: Not on file     Other Topics Concern    Not on file     Social History Narrative         ALLERGIES: Review of patient's allergies indicates no known allergies.     Review of Systems Constitutional: Negative for fever. HENT: Negative for sore throat and trouble swallowing. Eyes: Negative for visual disturbance. Respiratory: Negative for shortness of breath. Cardiovascular: Negative for chest pain. Gastrointestinal: Negative for abdominal pain. Genitourinary: Negative for decreased urine volume. Musculoskeletal: Negative for gait problem. Skin: Negative for rash. Neurological: Negative for speech difficulty, light-headedness and headaches. Psychiatric/Behavioral: Positive for sleep disturbance. Vitals:    04/11/18 2346   BP: (!) 181/103   Pulse: 81   Resp: 18   Temp: 98.2 °F (36.8 °C)   SpO2: 100%            Physical Exam   Constitutional: He is oriented to person, place, and time. Non-toxic appearance. He does not appear ill. No distress. HENT:   Head: Normocephalic and atraumatic. Right Ear: External ear normal.   Left Ear: External ear normal.   Nose: Nose normal.   Mouth/Throat: Oropharynx is clear and moist. No oropharyngeal exudate. Eyes: Conjunctivae are normal.   Neck: Normal range of motion. Cardiovascular: Normal rate, regular rhythm, normal heart sounds and intact distal pulses. Pulmonary/Chest: Effort normal and breath sounds normal. No respiratory distress. Abdominal: Soft. There is no tenderness. Musculoskeletal: Normal range of motion. He exhibits no edema. Neurological: He is alert and oriented to person, place, and time. Skin: Skin is warm and dry. He is not diaphoretic. Psychiatric: His behavior is normal.   Nursing note and vitals reviewed.        [facility]      ED Course       Procedures  Vitals:  Patient Vitals for the past 12 hrs:   Temp Pulse Resp BP SpO2   04/11/18 2346 98.2 °F (36.8 °C) 81 18 (!) 181/103 100 %         Medications ordered:   Medications - No data to display      Lab findings:  Recent Results (from the past 12 hour(s))   GLUCOSE, POC    Collection Time: 04/11/18 11:48 PM   Result Value Ref Range    Glucose (POC) 180 (H) 70 - 110 mg/dL   CBC WITH AUTOMATED DIFF    Collection Time: 04/11/18 11:58 PM   Result Value Ref Range    WBC 8.1 4.6 - 13.2 K/uL    RBC 4.78 4.70 - 5.50 M/uL    HGB 13.7 13.0 - 16.0 g/dL    HCT 39.0 36.0 - 48.0 %    MCV 81.6 74.0 - 97.0 FL    MCH 28.7 24.0 - 34.0 PG    MCHC 35.1 31.0 - 37.0 g/dL    RDW 12.9 11.6 - 14.5 %    PLATELET 919 600 - 112 K/uL    MPV 9.0 (L) 9.2 - 11.8 FL    NEUTROPHILS 62 40 - 73 %    LYMPHOCYTES 26 21 - 52 %    MONOCYTES 9 3 - 10 %    EOSINOPHILS 3 0 - 5 %    BASOPHILS 0 0 - 2 %    ABS. NEUTROPHILS 5.0 1.8 - 8.0 K/UL    ABS. LYMPHOCYTES 2.1 0.9 - 3.6 K/UL    ABS. MONOCYTES 0.7 0.05 - 1.2 K/UL    ABS. EOSINOPHILS 0.2 0.0 - 0.4 K/UL    ABS. BASOPHILS 0.0 0.0 - 0.06 K/UL    DF AUTOMATED     METABOLIC PANEL, COMPREHENSIVE    Collection Time: 04/11/18 11:58 PM   Result Value Ref Range    Sodium 135 (L) 136 - 145 mmol/L    Potassium 3.3 (L) 3.5 - 5.5 mmol/L    Chloride 98 (L) 100 - 108 mmol/L    CO2 29 21 - 32 mmol/L    Anion gap 8 3.0 - 18 mmol/L    Glucose 156 (H) 74 - 99 mg/dL    BUN 22 (H) 7.0 - 18 MG/DL    Creatinine 1.70 (H) 0.6 - 1.3 MG/DL    BUN/Creatinine ratio 13 12 - 20      GFR est AA 49 (L) >60 ml/min/1.73m2    GFR est non-AA 40 (L) >60 ml/min/1.73m2    Calcium 9.1 8.5 - 10.1 MG/DL    Bilirubin, total 0.3 0.2 - 1.0 MG/DL    ALT (SGPT) 36 16 - 61 U/L    AST (SGOT) 38 (H) 15 - 37 U/L    Alk. phosphatase 88 45 - 117 U/L    Protein, total 8.2 6.4 - 8.2 g/dL    Albumin 4.2 3.4 - 5.0 g/dL    Globulin 4.0 2.0 - 4.0 g/dL    A-G Ratio 1.1 0.8 - 1.7     GLUCOSE, POC    Collection Time: 04/12/18 12:52 AM   Result Value Ref Range    Glucose (POC) 234 (H) 70 - 110 mg/dL       EKG interpretation by ED Physician:      X-Ray, CT or other radiology findings or impressions:  No orders to display       Progress notes, Consult notes or additional Procedure notes:   Stable glucose.  Renal function not sig changed  Doubt need for further evaluation  I have discussed with patient and/or family/sig other the results, interpretation of any imaging if performed, suspected diagnosis and treatment plan to include instructions regarding the diagnoses listed to which understanding was expressed with all questions answered      Reevaluation of patient:   stable    Disposition:  Diagnosis:   1. IDDM (insulin dependent diabetes mellitus) ([facility] [address]    2. Stage 2 chronic kidney disease        Disposition: home    Follow-up Information     Follow up With Details Comments Contact Info    Gianni Painting MD Schedule an appointment as soon as possible for a visit or with regular doctor if there is one locally [address] [address]  [phone]      [facility] EMERGENCY DEPT  If symptoms worsen [address] [address] [phone]            Patient's Medications   Start Taking    No medications on file   Continue Taking    BLOOD-GLUCOSE METER MONITORING KIT    Test 4-5 times per day as needed    CARVEDILOL (COREG) 12.5 MG TABLET    TK 1 T PO  BID    CHLORTHALIDONE (HYGROTEN) 25 MG TABLET    TK 1 T PO D    COMBIGAN 0.2-0.5 % DROP OPHTHALMIC SOLUTION        GLUCOSE BLOOD VI TEST STRIPS (BLOOD GLUCOSE TEST) STRIP    Test 4-5 times per day as needed    GLUCOSE BLOOD VI TEST STRIPS (ONETOUCH ULTRA TEST) STRIP    by Does Not Apply route See Admin Instructions. Dx. Code E11.9    SRIDEVI PEN NEEDLE 32 GAUGE X 5/32\" NDLE        NOVOLOG MIX 70-30 FLEXPEN 100 UNIT/ML (70-30) INPN        ONETOUCH ULTRA TEST STRIP    TEST FOUR TIMES DAILY AS DIRECTED    PHENTERMINE (ADIPEX_P) 15 MG CAPSULE    TK 1 C PO ONCE QD    QUINAPRIL (ACCUPRIL) 40 MG TABLET    TAKE 2 TABLETS BY MOUTH EVERY DAY    TADALAFIL (CIALIS) 20 MG TABLET    Take 1 Tab by mouth as needed. TRIAMCINOLONE ACETONIDE (KENALOG) 0.1 % TOPICAL CREAM    Apply  to affected area two (2) times a day.  use thin layer   These Medications have changed    No medications on file   Stop Taking    No medications on file

## 2018-05-17 ENCOUNTER — OFFICE VISIT (OUTPATIENT)
Dept: FAMILY MEDICINE CLINIC | Age: 68
End: 2018-05-17

## 2018-05-17 ENCOUNTER — HOSPITAL ENCOUNTER (OUTPATIENT)
Dept: LAB | Age: 68
Discharge: HOME | End: 2018-05-17
Payer: MEDICARE

## 2018-05-17 VITALS
WEIGHT: 229.8 LBS | BODY MASS INDEX: 28.57 KG/M2 | SYSTOLIC BLOOD PRESSURE: 108 MMHG | TEMPERATURE: 98.1 F | RESPIRATION RATE: 22 BRPM | DIASTOLIC BLOOD PRESSURE: 70 MMHG | OXYGEN SATURATION: 96 % | HEIGHT: 75 IN | HEART RATE: 63 BPM

## 2018-05-17 DIAGNOSIS — N18.2 CKD (CHRONIC KIDNEY DISEASE) STAGE 2, GFR 60-89 ML/MIN: ICD-10-CM

## 2018-05-17 DIAGNOSIS — E11.21 TYPE 2 DIABETES MELLITUS WITH DIABETIC NEPHROPATHY, WITH LONG-TERM CURRENT USE OF INSULIN (HCC): ICD-10-CM

## 2018-05-17 DIAGNOSIS — Z79.4 TYPE 2 DIABETES MELLITUS WITH DIABETIC NEPHROPATHY, WITH LONG-TERM CURRENT USE OF INSULIN (HCC): ICD-10-CM

## 2018-05-17 DIAGNOSIS — I10 ESSENTIAL HYPERTENSION, BENIGN: ICD-10-CM

## 2018-05-17 DIAGNOSIS — R10.84 GENERALIZED ABDOMINAL DISCOMFORT: Primary | ICD-10-CM

## 2018-05-17 LAB — GLUCOSE POC: 169 MG/DL

## 2018-05-17 PROCEDURE — 36415 COLL VENOUS BLD VENIPUNCTURE: CPT

## 2018-05-17 PROCEDURE — 80053 COMPREHEN METABOLIC PANEL: CPT

## 2018-05-17 PROCEDURE — 85025 COMPLETE CBC W/AUTO DIFF WBC: CPT

## 2018-05-17 NOTE — PROGRESS NOTES
Chief Complaint   Patient presents with    Diabetes     F/U on diabetes.  Hypertension     F/U on BP.

## 2018-05-17 NOTE — PROGRESS NOTES
HISTORY OF PRESENT ILLNESS  Rachel Buck is a 76 y.o. male. Recently seen at urgent care for intestinal infection,slowly improving bloating and didtention,f/u hbp,chol,endo followed by endo. Doing well  Diabetes   The history is provided by the patient. This is a chronic problem. The problem has not changed since onset. Associated symptoms include abdominal pain. Pertinent negatives include no chest pain. Hypertension    This is a chronic problem. The problem has not changed since onset. Associated symptoms include malaise/fatigue. Pertinent negatives include no chest pain and no orthopnea. Bloated   The history is provided by the patient. This is a new problem. The current episode started more than 2 days ago. The problem occurs daily. The problem has been gradually improving. Associated symptoms include abdominal pain. Pertinent negatives include no chest pain. Review of Systems   Constitutional: Positive for malaise/fatigue. Negative for fever. Cardiovascular: Negative for chest pain and orthopnea. Gastrointestinal: Positive for abdominal pain. Negative for blood in stool, constipation and melena. Genitourinary: Negative for dysuria, frequency and urgency. Physical Exam   Constitutional: He appears well-developed and well-nourished. HENT:   Head: Normocephalic and atraumatic. Right Ear: External ear normal.   Left Ear: External ear normal.   Nose: Nose normal.   Mouth/Throat: Oropharynx is clear and moist.   Cardiovascular: Normal rate and regular rhythm. Pulmonary/Chest: Effort normal and breath sounds normal. No respiratory distress. Abdominal: Soft. He exhibits no distension. There is no tenderness. There is no rebound and no guarding. Skin: Skin is warm and dry. No erythema. ASSESSMENT and PLAN  Diagnoses and all orders for this visit:    1.  Generalized abdominal discomfort,improving,likely resolving age  -     METABOLIC PANEL, COMPREHENSIVE  -     CBC WITH AUTOMATED DIFF    2. Type 2 diabetes mellitus with diabetic nephropathy, with long-term current use of insulin (HCC)  -     AMB POC GLUCOSE, QUANTITATIVE, BLOOD    3. Essential hypertension, benign    4. CKD (chronic kidney disease) stage 2, GFR 60-89 ml/min      Follow-up Disposition:  Return in about 3 months (around 8/17/2018).

## 2018-05-18 LAB
ALBUMIN SERPL-MCNC: 4.6 G/DL (ref 3.6–4.8)
ALBUMIN/GLOB SERPL: 1.4 {RATIO} (ref 1.2–2.2)
ALP SERPL-CCNC: 71 IU/L (ref 39–117)
ALT SERPL-CCNC: 15 IU/L (ref 0–44)
AST SERPL-CCNC: 20 IU/L (ref 0–40)
BASOPHILS # BLD AUTO: 0.1 X10E3/UL (ref 0–0.2)
BASOPHILS NFR BLD AUTO: 1 %
BILIRUB SERPL-MCNC: 0.4 MG/DL (ref 0–1.2)
BUN SERPL-MCNC: 17 MG/DL (ref 8–27)
BUN/CREAT SERPL: 11 (ref 10–24)
CALCIUM SERPL-MCNC: 9.5 MG/DL (ref 8.6–10.2)
CHLORIDE SERPL-SCNC: 94 MMOL/L (ref 96–106)
CO2 SERPL-SCNC: 27 MMOL/L (ref 18–29)
CREAT SERPL-MCNC: 1.59 MG/DL (ref 0.76–1.27)
EOSINOPHIL # BLD AUTO: 0.2 X10E3/UL (ref 0–0.4)
EOSINOPHIL NFR BLD AUTO: 2 %
ERYTHROCYTE [DISTWIDTH] IN BLOOD BY AUTOMATED COUNT: 14.5 % (ref 12.3–15.4)
GFR SERPLBLD CREATININE-BSD FMLA CKD-EPI: 44 ML/MIN/1.73
GFR SERPLBLD CREATININE-BSD FMLA CKD-EPI: 51 ML/MIN/1.73
GLOBULIN SER CALC-MCNC: 3.2 G/DL (ref 1.5–4.5)
GLUCOSE SERPL-MCNC: 174 MG/DL (ref 65–99)
HCT VFR BLD AUTO: 38.8 % (ref 37.5–51)
HGB BLD-MCNC: 13.1 G/DL (ref 13–17.7)
IMM GRANULOCYTES # BLD: 0 X10E3/UL (ref 0–0.1)
IMM GRANULOCYTES NFR BLD: 0 %
INTERPRETATION: NORMAL
LYMPHOCYTES # BLD AUTO: 2.5 X10E3/UL (ref 0.7–3.1)
LYMPHOCYTES NFR BLD AUTO: 30 %
MCH RBC QN AUTO: 28.1 PG (ref 26.6–33)
MCHC RBC AUTO-ENTMCNC: 33.8 G/DL (ref 31.5–35.7)
MCV RBC AUTO: 83 FL (ref 79–97)
MONOCYTES # BLD AUTO: 0.6 X10E3/UL (ref 0.1–0.9)
MONOCYTES NFR BLD AUTO: 7 %
NEUTROPHILS # BLD AUTO: 5 X10E3/UL (ref 1.4–7)
NEUTROPHILS NFR BLD AUTO: 60 %
PLATELET # BLD AUTO: 246 X10E3/UL (ref 150–379)
POTASSIUM SERPL-SCNC: 3.6 MMOL/L (ref 3.5–5.2)
PROT SERPL-MCNC: 7.8 G/DL (ref 6–8.5)
RBC # BLD AUTO: 4.66 X10E6/UL (ref 4.14–5.8)
SODIUM SERPL-SCNC: 135 MMOL/L (ref 134–144)
WBC # BLD AUTO: 8.2 X10E3/UL (ref 3.4–10.8)

## 2018-06-21 ENCOUNTER — HOSPITAL ENCOUNTER (OUTPATIENT)
Dept: LAB | Age: 68
Discharge: HOME | End: 2018-06-21
Payer: MEDICARE

## 2018-06-21 DIAGNOSIS — I10 HYPERTENSION, ESSENTIAL: ICD-10-CM

## 2018-06-21 DIAGNOSIS — E11.29 DIABETES MELLITUS WITH RENAL MANIFESTATION (HCC): ICD-10-CM

## 2018-06-21 LAB
ALBUMIN SERPL-MCNC: 3.8 G/DL (ref 3.4–5)
ANION GAP SERPL CALC-SCNC: 7 MMOL/L (ref 3–18)
BUN SERPL-MCNC: 18 MG/DL (ref 7–18)
BUN/CREAT SERPL: 12 (ref 12–20)
CALCIUM SERPL-MCNC: 8.3 MG/DL (ref 8.5–10.1)
CHLORIDE SERPL-SCNC: 95 MMOL/L (ref 100–108)
CO2 SERPL-SCNC: 32 MMOL/L (ref 21–32)
CREAT SERPL-MCNC: 1.55 MG/DL (ref 0.6–1.3)
CREAT UR-MCNC: 191 MG/DL (ref 30–125)
GLUCOSE SERPL-MCNC: 202 MG/DL (ref 74–99)
PHOSPHATE SERPL-MCNC: 2.8 MG/DL (ref 2.5–4.9)
POTASSIUM SERPL-SCNC: 3.7 MMOL/L (ref 3.5–5.5)
PROT UR-MCNC: 7 MG/DL
SODIUM SERPL-SCNC: 134 MMOL/L (ref 136–145)

## 2018-06-21 PROCEDURE — 80069 RENAL FUNCTION PANEL: CPT | Performed by: INTERNAL MEDICINE

## 2018-06-21 PROCEDURE — 84244 ASSAY OF RENIN: CPT | Performed by: INTERNAL MEDICINE

## 2018-06-21 PROCEDURE — 82570 ASSAY OF URINE CREATININE: CPT | Performed by: FAMILY MEDICINE

## 2018-06-21 PROCEDURE — 82088 ASSAY OF ALDOSTERONE: CPT | Performed by: INTERNAL MEDICINE

## 2018-06-21 PROCEDURE — 84156 ASSAY OF PROTEIN URINE: CPT | Performed by: INTERNAL MEDICINE

## 2018-06-21 PROCEDURE — 36415 COLL VENOUS BLD VENIPUNCTURE: CPT | Performed by: INTERNAL MEDICINE

## 2018-06-25 LAB
ALDOST SERPL-MCNC: 11.3 NG/DL (ref 0–30)
RENIN PLAS-CCNC: <0.167 NG/ML/HR (ref 0.17–5.38)

## 2018-08-27 ENCOUNTER — OFFICE VISIT (OUTPATIENT)
Dept: FAMILY MEDICINE CLINIC | Age: 68
End: 2018-08-27

## 2018-08-27 VITALS
HEIGHT: 75 IN | DIASTOLIC BLOOD PRESSURE: 88 MMHG | OXYGEN SATURATION: 98 % | RESPIRATION RATE: 22 BRPM | WEIGHT: 237.2 LBS | TEMPERATURE: 98 F | HEART RATE: 80 BPM | BODY MASS INDEX: 29.49 KG/M2 | SYSTOLIC BLOOD PRESSURE: 144 MMHG

## 2018-08-27 DIAGNOSIS — I10 ESSENTIAL HYPERTENSION, BENIGN: ICD-10-CM

## 2018-08-27 DIAGNOSIS — N18.2 CKD (CHRONIC KIDNEY DISEASE) STAGE 2, GFR 60-89 ML/MIN: ICD-10-CM

## 2018-08-27 DIAGNOSIS — M54.5 ACUTE MIDLINE LOW BACK PAIN, WITH SCIATICA PRESENCE UNSPECIFIED: Primary | ICD-10-CM

## 2018-08-27 DIAGNOSIS — N52.9 ERECTILE DYSFUNCTION, UNSPECIFIED ERECTILE DYSFUNCTION TYPE: ICD-10-CM

## 2018-08-27 RX ORDER — TADALAFIL 20 MG/1
20 TABLET ORAL AS NEEDED
Qty: 50 TAB | Refills: 1 | Status: SHIPPED | OUTPATIENT
Start: 2018-08-27 | End: 2021-11-22 | Stop reason: ALTCHOICE

## 2018-08-27 RX ORDER — CELECOXIB 200 MG/1
200 CAPSULE ORAL 2 TIMES DAILY
Qty: 30 CAP | Refills: 2 | Status: SHIPPED | OUTPATIENT
Start: 2018-08-27 | End: 2018-11-25

## 2018-08-27 RX ORDER — METHOCARBAMOL 500 MG/1
500 TABLET, FILM COATED ORAL 4 TIMES DAILY
Qty: 40 TAB | Refills: 2 | Status: SHIPPED | OUTPATIENT
Start: 2018-08-27 | End: 2019-10-02

## 2018-08-27 NOTE — PROGRESS NOTES
HISTORY OF PRESENT ILLNESS  Samuel Kwok is a 76 y.o. male. 10 days low lumbar ,nonradiating lumbago,in laminectomy site. No apparent injury  Back Pain    The history is provided by the patient. This is a new problem. The current episode started more than 1 week ago. The problem has not changed since onset. The problem occurs hourly. The pain is associated with no known injury. The pain is present in the lumbar spine. The quality of the pain is described as stabbing. The pain does not radiate. The pain is at a severity of 5/10. The symptoms are aggravated by certain positions. Pertinent negatives include no chest pain and no fever. Hypertension    This is a chronic problem. The problem has not changed since onset. Pertinent negatives include no chest pain, no orthopnea, no palpitations, no malaise/fatigue, no blurred vision, no peripheral edema and no dizziness. Review of Systems   Constitutional: Negative for fever and malaise/fatigue. Eyes: Negative for blurred vision. Cardiovascular: Negative for chest pain, palpitations and orthopnea. Gastrointestinal: Negative for constipation. Genitourinary: Negative for frequency. Musculoskeletal: Positive for back pain. Neurological: Negative for dizziness. Physical Exam   Constitutional: He appears well-developed and well-nourished. HENT:   Head: Normocephalic and atraumatic. Right Ear: External ear normal.   Left Ear: External ear normal.   Nose: Nose normal.   Mouth/Throat: Oropharynx is clear and moist.   Cardiovascular: Normal rate and regular rhythm. Pulmonary/Chest: Effort normal and breath sounds normal.   Abdominal: Soft. Bowel sounds are normal.   Musculoskeletal:        Lumbar back: He exhibits decreased range of motion, tenderness and bony tenderness. He exhibits no spasm. Back:    SLRs 90/90,DTRs normal and symmetrical     Skin: Skin is warm and dry. ASSESSMENT and PLAN  Diagnoses and all orders for this visit:    1. Acute midline low back pain, with sciatica presence unspecified  -     celecoxib (CELEBREX) 200 mg capsule; Take 1 Cap by mouth two (2) times a day for 90 days. -     methocarbamol (ROBAXIN) 500 mg tablet; Take 1 Tab by mouth four (4) times daily. -     XR SPINE LUMB 2 OR 3 V; Future    2. CKD (chronic kidney disease) stage 2, GFR 60-89 ml/min    3. Essential hypertension, benign    4. Erectile dysfunction, unspecified erectile dysfunction type  -     tadalafil (CIALIS) 20 mg tablet; Take 1 Tab by mouth as needed. Follow-up Disposition:  Return in about 3 months (around 11/27/2018).

## 2018-08-27 NOTE — MR AVS SNAPSHOT
[facility] 
 
 
 [address] [facility] [place] [address] [id number] [phone] Patient: Ramin Mirza MRN: [id number] CZB:[id number] Visit Information Date & Time Provider Department Dept. Phone Encounter #  
 8/27/2018  2:15 PM Aristides Wayne [phone] [id number] Follow-up Instructions Return in about 3 months (around 11/27/2018). Upcoming Health Maintenance Date Due FOBT Q 1 YEAR AGE 50-75 4/17/2000 EYE EXAM RETINAL OR DILATED Q1 7/22/2016 GLAUCOMA SCREENING Q2Y 7/22/2017 MICROALBUMIN Q1 3/3/2018 MEDICARE YEARLY EXAM 6/13/2018 HEMOGLOBIN A1C Q6M 7/2/2018 Influenza Age 5 to Adult 8/1/2018 LIPID PANEL Q1 1/2/2019 FOOT EXAM Q1 1/12/2019 DTaP/Tdap/Td series (2 - Td) 7/5/2026 Allergies as of 8/27/2018  Review Complete On: 8/27/2018 By: Sonal Lucero No Known Allergies Current Immunizations  Reviewed on 11/3/2016 Name Date Influenza High Dose Vaccine PF 11/3/2016 Influenza Vaccine Split 10/11/2012 Pneumococcal Conjugate (PCV-13) 7/30/2015 ZZZ-RETIRED (DO NOT USE) Pneumococcal Vaccine (Unspecified Type) 10/11/2012 Not reviewed this visit You Were Diagnosed With   
  
 Codes Comments Acute midline low back pain, with sciatica presence unspecified    -  Primary ICD-10-CM: M54.5 ICD-9-CM: 724.2 CKD (chronic kidney disease) stage 2, GFR 60-89 ml/min     ICD-10-CM: N18.2 ICD-9-CM: 585.2 Essential hypertension, benign     ICD-10-CM: I10 
ICD-9-CM: 401.1 Erectile dysfunction, unspecified erectile dysfunction type     ICD-10-CM: N52.9 ICD-9-CM: 607.84 Vitals BP Pulse Temp Resp Height(growth percentile) Weight(growth percentile) 144/88 (BP 1 Location: Left arm, BP Patient Position: Sitting) 80 98 °F (36.7 °C) (Oral) 22 6' 2.5\" (1.892 m) 237 lb 3.2 oz (107.6 kg) SpO2 BMI Smoking Status 98% 30.05 kg/m2 Former Smoker Vitals History BMI and BSA Data Body Mass Index Body Surface Area 30.05 kg/m 2 2.38 m 2 Preferred Pharmacy Pharmacy Name Phone [facility] [address], [address] [address] [phone] Your Updated Medication List  
  
   
This list is accurate as of 8/27/18  2:32 PM.  Always use your most recent med list.  
  
  
  
  
 Blood-Glucose Meter monitoring kit Test 4-5 times per day as needed  
  
 carvedilol 12.5 mg tablet Commonly known as:  COREG  
TK 1 T PO  BID  
  
 celecoxib 200 mg capsule Commonly known as:  CELEBREX Take 1 Cap by mouth two (2) times a day for 90 days. chlorthalidone 25 mg tablet Commonly known as:  HYGROTEN  
TK 1 T PO D  
  
 COMBIGAN 0.2-0.5 % Drop ophthalmic solution Generic drug:  brimonidine-timolol * glucose blood VI test strips strip Commonly known as:  blood glucose test  
Test 4-5 times per day as needed * glucose blood VI test strips strip Commonly known as:  ONETOUCH ULTRA TEST  
by Does Not Apply route See Admin Instructions. Dx. Code E11.9  
  
 * ONETOUCH ULTRA TEST strip Generic drug:  glucose blood VI test strips TEST FOUR TIMES DAILY AS DIRECTED  
  
 methocarbamol 500 mg tablet Commonly known as:  ROBAXIN Take 1 Tab by mouth four (4) times daily. Marielena Pen Needle 32 gauge x 5/32\" Ndle Generic drug:  Insulin Needles (Disposable) NovoLOG Mix 70-30FlexPen U-100 100 unit/mL (70-30) Inpn Generic drug:  insulin aspart protamine/insulin aspart  
  
 quinapril 40 mg tablet Commonly known as:  ACCUPRIL  
TAKE 2 TABLETS BY MOUTH EVERY DAY  
  
 * tadalafil 20 mg tablet Commonly known as:  CIALIS Take 1 Tab by mouth as needed. * tadalafil 20 mg tablet Commonly known as:  CIALIS Take 1 Tab by mouth as needed.   
  
 triamcinolone acetonide 0.1 % topical cream  
 Commonly known as:  KENALOG Apply  to affected area two (2) times a day. use thin layer * Notice: This list has 5 medication(s) that are the same as other medications prescribed for you. Read the directions carefully, and ask your doctor or other care provider to review them with you. Prescriptions Printed Refills  
 tadalafil (CIALIS) 20 mg tablet 1 Sig: Take 1 Tab by mouth as needed. Class: Print Route: Oral  
  
Prescriptions Sent to Pharmacy Refills  
 celecoxib (CELEBREX) 200 mg capsule 2 Sig: Take 1 Cap by mouth two (2) times a day for 90 days. Class: Normal  
 Pharmacy: [facility] [address] [facility], [address] [address] Ph #: [phone] Route: Oral  
 methocarbamol (ROBAXIN) 500 mg tablet 2 Sig: Take 1 Tab by mouth four (4) times daily. Class: Normal  
 Pharmacy: [facility] [address] [facility], [address] [address] Ph #: [phone] Route: Oral  
  
Follow-up Instructions Return in about 3 months (around 11/27/2018). To-Do List   
 08/27/2018 Imaging:  XR SPINE LUMB 2 OR 3 V Introducing \[facility] [place] [facility]\"" & HEALTH SERVICES! [facility] introduces [employer] patient portal. Now you can access parts of your medical record, email your doctor's office, and request medication refills online. 1. In your internet browser, go to [email or web address] 2. Click on the First Time User? Click Here link in the Sign In box. You will see the New Member Sign Up page. 3. Enter your Mo-[employer] Access Code exactly as it appears below. You will not need to use this code after youve completed the sign-up process. If you do not sign up before the expiration date, you must request a new code. · Mo-[employer] Access Code: [email or web address] Expires: 11/25/2018  2:32 PM 
 
4.  Enter the last four digits of your Social Security Number (xxxx) and Date of Birth (mm/dd/yyyy) as indicated and click Submit. You will be taken to the next sign-up page. 5. Create a [employer] ID. This will be your [employer] login ID and cannot be changed, so think of one that is secure and easy to remember. 6. Create a [employer] password. You can change your password at any time. 7. Enter your Password Reset Question and Answer. This can be used at a later time if you forget your password. 8. Enter your e-mail address. You will receive e-mail notification when new information is available in [address] 9. Click Sign Up. You can now view and download portions of your medical record. 10. Click the Download Summary menu link to download a portable copy of your medical information. If you have questions, please visit the Frequently Asked Questions section of the [employer] website. Remember, [employer] is NOT to be used for urgent needs. For medical emergencies, dial 911. Now available from your iPhone and Android! Please provide this summary of care documentation to your next provider. Your primary care clinician is listed as Angel . If you have any questions after today's visit, please call [phone].

## 2018-09-18 ENCOUNTER — OFFICE VISIT (OUTPATIENT)
Dept: FAMILY MEDICINE CLINIC | Age: 68
End: 2018-09-18

## 2018-09-18 VITALS
HEART RATE: 65 BPM | TEMPERATURE: 98 F | SYSTOLIC BLOOD PRESSURE: 126 MMHG | DIASTOLIC BLOOD PRESSURE: 82 MMHG | HEIGHT: 75 IN | WEIGHT: 238 LBS | OXYGEN SATURATION: 99 % | BODY MASS INDEX: 29.59 KG/M2 | RESPIRATION RATE: 18 BRPM

## 2018-09-18 DIAGNOSIS — N52.9 ERECTILE DYSFUNCTION, UNSPECIFIED ERECTILE DYSFUNCTION TYPE: Primary | ICD-10-CM

## 2018-09-18 DIAGNOSIS — L30.9 ECZEMA, UNSPECIFIED TYPE: ICD-10-CM

## 2018-09-18 RX ORDER — SILDENAFIL 100 MG/1
100 TABLET, FILM COATED ORAL AS NEEDED
Qty: 10 TAB | Refills: 11 | Status: SHIPPED | OUTPATIENT
Start: 2018-09-18 | End: 2019-08-07 | Stop reason: SDUPTHER

## 2018-09-18 RX ORDER — TRIAMCINOLONE ACETONIDE 1 MG/G
CREAM TOPICAL
Qty: 84 G | Refills: 11 | Status: SHIPPED | OUTPATIENT
Start: 2018-09-18 | End: 2019-10-02 | Stop reason: SDUPTHER

## 2018-09-18 NOTE — PROGRESS NOTES
HISTORY OF PRESENT ILLNESS Josefina Leonard is a 76 y.o. male. scattered itching patches arms for pastseveral weeks. No apparent toxin exposure Rash The history is provided by the patient. This is a chronic problem. The current episode started more than 1 week ago. The problem has been gradually worsening. The problem is associated with nothing. The rash is present on the right arm and left arm. The pain has been fluctuating since onset. Associated symptoms include itching. Review of Systems Constitutional: Negative for fever and malaise/fatigue. Skin: Positive for itching and rash. Physical Exam  
Constitutional: He appears well-developed and well-nourished. HENT:  
Head: Normocephalic and atraumatic. Right Ear: External ear normal.  
Left Ear: External ear normal.  
Nose: Nose normal.  
Mouth/Throat: Oropharynx is clear and moist.  
Cardiovascular: Normal rate and regular rhythm. Pulmonary/Chest: Effort normal and breath sounds normal.  
Abdominal: Soft. Bowel sounds are normal.  
Skin: Skin is warm and dry. Rash noted. No erythema. Hypopigmented small scally patches upper extremities ASSESSMENT and PLAN Diagnoses and all orders for this visit: 1. Erectile dysfunction, unspecified erectile dysfunction type 
-     sildenafil citrate (VIAGRA) 100 mg tablet; Take 1 Tab by mouth as needed. 2. Eczema, unspecified type 
-     triamcinolone acetonide (KENALOG) 0.1 % topical cream; Apply  to affected area three (3) times daily as needed for Skin Irritation. Follow-up Disposition: 
Return in about 6 months (around 3/18/2019).

## 2018-09-18 NOTE — MR AVS SNAPSHOT
[facility] 
 
 
 [address] [facility] [address] [id number] [phone] Patient: Patsy Marte MRN: [id number] IHE:[id number] Visit Information Date & Time Provider Department Dept. Phone Encounter #  
 9/18/2018 12:15 PM Thomaster Severs, [address] [place] [address] [phone] [id number] Follow-up Instructions Return in about 6 months (around 3/18/2019). Upcoming Health Maintenance Date Due FOBT Q 1 YEAR AGE 50-75 4/17/2000 EYE EXAM RETINAL OR DILATED Q1 7/22/2016 GLAUCOMA SCREENING Q2Y 7/22/2017 MICROALBUMIN Q1 3/3/2018 MEDICARE YEARLY EXAM 6/13/2018 HEMOGLOBIN A1C Q6M 7/2/2018 Influenza Age 5 to Adult 8/1/2018 LIPID PANEL Q1 1/2/2019 FOOT EXAM Q1 1/12/2019 DTaP/Tdap/Td series (2 - Td) 7/5/2026 Allergies as of 9/18/2018  Review Complete On: 9/18/2018 By: Bar Leonard No Known Allergies Current Immunizations  Reviewed on 11/3/2016 Name Date Influenza High Dose Vaccine PF 11/3/2016 Influenza Vaccine Split 10/11/2012 Pneumococcal Conjugate (PCV-13) 7/30/2015 ZZZ-RETIRED (DO NOT USE) Pneumococcal Vaccine (Unspecified Type) 10/11/2012 Not reviewed this visit You Were Diagnosed With   
  
 Codes Comments Erectile dysfunction, unspecified erectile dysfunction type    -  Primary ICD-10-CM: N52.9 ICD-9-CM: 607.84 Eczema, unspecified type     ICD-10-CM: L30.9 ICD-9-CM: 692.9 Vitals BP Pulse Temp Resp Height(growth percentile) Weight(growth percentile) 126/82 (BP 1 Location: Left arm, BP Patient Position: Sitting) 65 98 °F (36.7 °C) (Oral) 18 6' 2.5\" (1.892 m) 238 lb (108 kg) SpO2 BMI Smoking Status 99% 30.15 kg/m2 Former Smoker Vitals History BMI and BSA Data Body Mass Index Body Surface Area  
 30.15 kg/m 2 2.38 m 2 Preferred Pharmacy Pharmacy Name Phone [facility] [address], [address] [address] [phone] Your Updated Medication List  
  
   
This list is accurate as of 9/18/18 12:25 PM.  Always use your most recent med list.  
  
  
  
  
 Blood-Glucose Meter monitoring kit Test 4-5 times per day as needed  
  
 carvedilol 12.5 mg tablet Commonly known as:  COREG  
TK 1 T PO  BID  
  
 celecoxib 200 mg capsule Commonly known as:  CELEBREX Take 1 Cap by mouth two (2) times a day for 90 days. chlorthalidone 25 mg tablet Commonly known as:  HYGROTEN  
TK 1 T PO D  
  
 COMBIGAN 0.2-0.5 % Drop ophthalmic solution Generic drug:  brimonidine-timolol * glucose blood VI test strips strip Commonly known as:  blood glucose test  
Test 4-5 times per day as needed * glucose blood VI test strips strip Commonly known as:  ONETOUCH ULTRA TEST  
by Does Not Apply route See Admin Instructions. Dx. Code E11.9  
  
 * ONETOUCH ULTRA TEST strip Generic drug:  glucose blood VI test strips TEST FOUR TIMES DAILY AS DIRECTED  
  
 methocarbamol 500 mg tablet Commonly known as:  ROBAXIN Take 1 Tab by mouth four (4) times daily. Marielena Pen Needle 32 gauge x 5/32\" Ndle Generic drug:  Insulin Needles (Disposable) NovoLOG Mix 70-30FlexPen U-100 100 unit/mL (70-30) Inpn Generic drug:  insulin aspart protamine/insulin aspart  
  
 quinapril 40 mg tablet Commonly known as:  ACCUPRIL  
TAKE 2 TABLETS BY MOUTH EVERY DAY  
  
 sildenafil citrate 100 mg tablet Commonly known as:  VIAGRA Take 1 Tab by mouth as needed. * tadalafil 20 mg tablet Commonly known as:  CIALIS Take 1 Tab by mouth as needed. * tadalafil 20 mg tablet Commonly known as:  CIALIS Take 1 Tab by mouth as needed. * triamcinolone acetonide 0.1 % topical cream  
Commonly known as:  KENALOG Apply  to affected area two (2) times a day. use thin layer * triamcinolone acetonide 0.1 % topical cream  
Commonly known as:  KENALOG Apply  to affected area three (3) times daily as needed for Skin Irritation. * Notice: This list has 7 medication(s) that are the same as other medications prescribed for you. Read the directions carefully, and ask your doctor or other care provider to review them with you. Prescriptions Printed Refills  
 sildenafil citrate (VIAGRA) 100 mg tablet 11 Sig: Take 1 Tab by mouth as needed. Class: Print Route: Oral  
  
Prescriptions Sent to Pharmacy Refills  
 triamcinolone acetonide (KENALOG) 0.1 % topical cream 11 Sig: Apply  to affected area three (3) times daily as needed for Skin Irritation. Class: Normal  
 Pharmacy: [facility], [address] [place] [address] #: [phone] Route: Topical  
  
Follow-up Instructions Return in about 6 months (around 3/18/2019). Introducing [facility]! [facility] introduces [employer] patient portal. Now you can access parts of your medical record, email your doctor's office, and request medication refills online. 1. In your internet browser, go to [email or web address] 2. Click on the First Time User? Click Here link in the Sign In box. You will see the New Member Sign Up page. 3. Enter your [employer] Access Code exactly as it appears below. You will not need to use this code after youve completed the sign-up process. If you do not sign up before the expiration date, you must request a new code. · [employer] Access Code: [email or web address] Expires: 11/25/2018  2:32 PM 
 
4. Enter the last four digits of your Social Security Number (xxxx) and Date of Birth (mm/dd/yyyy) as indicated and click Submit. You will be taken to the next sign-up page. 5. Create a [employer] ID.  This will be your [employer] login ID and cannot be changed, so think of one that is secure and easy to remember. 6. Create a [facility] password. You can change your password at any time. 7. Enter your Password Reset Question and Answer. This can be used at a later time if you forget your password. 8. Enter your e-mail address. You will receive e-mail notification when new information is available in [address] 9. Click Sign Up. You can now view and download portions of your medical record. 10. Click the Download Summary menu link to download a portable copy of your medical information. If you have questions, please visit the Frequently Asked Questions section of the [employer] website. Remember, [facility] is NOT to be used for urgent needs. For medical emergencies, dial 911. Now available from your iPhone and Android! Please provide this summary of care documentation to your next provider. Your primary care clinician is listed as Noemi Cornea. If you have any questions after today's visit, please call [phone].

## 2018-10-22 DIAGNOSIS — I10 ESSENTIAL HYPERTENSION, BENIGN: ICD-10-CM

## 2018-10-22 RX ORDER — QUINAPRIL 40 MG/1
TABLET ORAL
Qty: 180 TAB | Refills: 8 | Status: SHIPPED | OUTPATIENT
Start: 2018-10-22 | End: 2020-01-02

## 2019-01-21 ENCOUNTER — HOSPITAL ENCOUNTER (OUTPATIENT)
Dept: LAB | Age: 69
Discharge: HOME | End: 2019-01-21
Payer: MEDICARE

## 2019-01-21 DIAGNOSIS — E11.29 DIABETES MELLITUS WITH RENAL MANIFESTATION (HCC): ICD-10-CM

## 2019-01-21 LAB
ALBUMIN SERPL-MCNC: 4.1 G/DL (ref 3.4–5)
ANION GAP SERPL CALC-SCNC: 4 MMOL/L (ref 3–18)
BUN SERPL-MCNC: 17 MG/DL (ref 7–18)
BUN/CREAT SERPL: 11 (ref 12–20)
CALCIUM SERPL-MCNC: 9.1 MG/DL (ref 8.5–10.1)
CHLORIDE SERPL-SCNC: 95 MMOL/L (ref 100–108)
CO2 SERPL-SCNC: 33 MMOL/L (ref 21–32)
CREAT SERPL-MCNC: 1.54 MG/DL (ref 0.6–1.3)
CREAT UR-MCNC: 127 MG/DL (ref 30–125)
GLUCOSE SERPL-MCNC: 276 MG/DL (ref 74–99)
PHOSPHATE SERPL-MCNC: 3.1 MG/DL (ref 2.5–4.9)
POTASSIUM SERPL-SCNC: 3.7 MMOL/L (ref 3.5–5.5)
PROT UR-MCNC: 18 MG/DL
SODIUM SERPL-SCNC: 132 MMOL/L (ref 136–145)

## 2019-01-21 PROCEDURE — 80069 RENAL FUNCTION PANEL: CPT

## 2019-01-21 PROCEDURE — 82570 ASSAY OF URINE CREATININE: CPT

## 2019-01-21 PROCEDURE — 84156 ASSAY OF PROTEIN URINE: CPT

## 2019-01-21 PROCEDURE — 36415 COLL VENOUS BLD VENIPUNCTURE: CPT

## 2019-05-03 ENCOUNTER — OFFICE VISIT (OUTPATIENT)
Dept: FAMILY MEDICINE CLINIC | Age: 69
End: 2019-05-03

## 2019-05-03 VITALS
WEIGHT: 238.4 LBS | DIASTOLIC BLOOD PRESSURE: 82 MMHG | SYSTOLIC BLOOD PRESSURE: 138 MMHG | TEMPERATURE: 98.3 F | OXYGEN SATURATION: 98 % | BODY MASS INDEX: 30.6 KG/M2 | HEIGHT: 74 IN | HEART RATE: 78 BPM | RESPIRATION RATE: 20 BRPM

## 2019-05-03 DIAGNOSIS — Z79.4 TYPE 2 DIABETES MELLITUS WITH DIABETIC NEPHROPATHY, WITH LONG-TERM CURRENT USE OF INSULIN (HCC): ICD-10-CM

## 2019-05-03 DIAGNOSIS — E11.21 TYPE 2 DIABETES MELLITUS WITH DIABETIC NEPHROPATHY, WITH LONG-TERM CURRENT USE OF INSULIN (HCC): ICD-10-CM

## 2019-05-03 DIAGNOSIS — I10 ESSENTIAL HYPERTENSION, BENIGN: ICD-10-CM

## 2019-05-03 DIAGNOSIS — N18.2 CKD (CHRONIC KIDNEY DISEASE) STAGE 2, GFR 60-89 ML/MIN: ICD-10-CM

## 2019-05-03 DIAGNOSIS — R07.9 CHEST PAIN, UNSPECIFIED TYPE: Primary | ICD-10-CM

## 2019-05-03 DIAGNOSIS — E78.2 MIXED HYPERLIPIDEMIA: ICD-10-CM

## 2019-05-03 RX ORDER — INSULIN GLARGINE 100 [IU]/ML
INJECTION, SOLUTION SUBCUTANEOUS
Refills: 2 | COMMUNITY
Start: 2019-04-08 | End: 2021-11-22 | Stop reason: ALTCHOICE

## 2019-05-03 NOTE — PROGRESS NOTES
HISTORY OF PRESENT ILLNESS Patsy Marte is a 71 y.o. male. recurrent atypical;l chest tightness for many months unchanged seen in ER moths ago in [facility],refused admission. Has dm2 hbp Chest Pain The history is provided by the patient. This is a recurrent problem. The problem has not changed since onset. The problem occurs rarely. The pain is associated with normal activity. The pain is present in the substernal region. The pain is at a severity of 3/10. The pain is moderate. Pertinent negatives include no fever, no nausea and no vomiting. Review of Systems Constitutional: Negative for fever and weight loss. Cardiovascular: Positive for chest pain. Gastrointestinal: Negative for heartburn, nausea and vomiting. Physical Exam  
Constitutional: He appears well-developed and well-nourished. HENT:  
Head: Normocephalic and atraumatic. Right Ear: External ear normal.  
Left Ear: External ear normal.  
Nose: Nose normal.  
Mouth/Throat: Oropharynx is clear and moist.  
Eyes: Pupils are equal, round, and reactive to light. Conjunctivae are normal.  
Neck: Normal range of motion. Neck supple. No tracheal deviation present. No thyromegaly present. Cardiovascular: Normal rate, regular rhythm and normal heart sounds. Exam reveals no gallop. No murmur heard. Pulmonary/Chest: Effort normal and breath sounds normal. No respiratory distress. Abdominal: Soft. Bowel sounds are normal.  
Musculoskeletal: Normal range of motion. Neurological: He is alert. Skin: Skin is warm and dry. Psychiatric: He has a normal mood and affect. Vitals reviewed. ASSESSMENT and PLAN Diagnoses and all orders for this visit: 
 
1. Chest pain, unspecified type,unlikely cardiac ,though lots of risk factors -     AMB POC EKG ROUTINE W/ 12 LEADS, INTER & REP 
-     REFERRAL TO CARDIOLOGY 
-     XR CHEST PA LAT; Future 2. Essential hypertension, benign 3. Type 2 diabetes mellitus with diabetic nephropathy, with long-term current use of insulin ([facility] [address] 4. CKD (chronic kidney disease) stage 2, GFR 60-89 ml/min 5. Mixed hyperlipidemia Follow-up and Dispositions · Return in about 1 month (around 5/31/2019).

## 2019-08-07 ENCOUNTER — OFFICE VISIT (OUTPATIENT)
Dept: FAMILY MEDICINE CLINIC | Age: 69
End: 2019-08-07

## 2019-08-07 VITALS
DIASTOLIC BLOOD PRESSURE: 84 MMHG | WEIGHT: 232.2 LBS | OXYGEN SATURATION: 98 % | SYSTOLIC BLOOD PRESSURE: 130 MMHG | BODY MASS INDEX: 29.8 KG/M2 | RESPIRATION RATE: 20 BRPM | HEART RATE: 73 BPM | TEMPERATURE: 98.4 F | HEIGHT: 74 IN

## 2019-08-07 DIAGNOSIS — I10 ESSENTIAL HYPERTENSION, BENIGN: ICD-10-CM

## 2019-08-07 DIAGNOSIS — N18.2 CKD (CHRONIC KIDNEY DISEASE) STAGE 2, GFR 60-89 ML/MIN: ICD-10-CM

## 2019-08-07 DIAGNOSIS — E78.2 MIXED HYPERLIPIDEMIA: ICD-10-CM

## 2019-08-07 DIAGNOSIS — E11.21 TYPE 2 DIABETES MELLITUS WITH DIABETIC NEPHROPATHY, WITH LONG-TERM CURRENT USE OF INSULIN (HCC): ICD-10-CM

## 2019-08-07 DIAGNOSIS — N52.9 ERECTILE DYSFUNCTION, UNSPECIFIED ERECTILE DYSFUNCTION TYPE: ICD-10-CM

## 2019-08-07 DIAGNOSIS — Z79.4 TYPE 2 DIABETES MELLITUS WITH DIABETIC NEPHROPATHY, WITH LONG-TERM CURRENT USE OF INSULIN (HCC): ICD-10-CM

## 2019-08-07 DIAGNOSIS — Z00.00 MEDICARE ANNUAL WELLNESS VISIT, SUBSEQUENT: Primary | ICD-10-CM

## 2019-08-07 RX ORDER — SILDENAFIL 100 MG/1
100 TABLET, FILM COATED ORAL AS NEEDED
Qty: 10 TAB | Refills: 11 | Status: SHIPPED | OUTPATIENT
Start: 2019-08-07 | End: 2021-07-23 | Stop reason: ALTCHOICE

## 2019-08-07 NOTE — PROGRESS NOTES
This is the Subsequent Medicare Annual Wellness Exam, performed 12 months or more after the Initial AWV or the last Subsequent AWV    I have reviewed the patient's medical history in detail and updated the computerized patient record. History     Past Medical History:   Diagnosis Date    Allergic rhinitis, cause unspecified 2/12/2013    CKD (chronic kidney disease) stage 2, GFR 60-89 ml/min 11/3/2016    Dysuria     ED (erectile dysfunction) 12/2/2011    Elevated PSA     benign PNBx 2012, 1/6/2016    Encounter for long-term (current) use of other medications 1/9/2011    Essential hypertension, benign 1/9/2011    Gross hematuria     Hemorrhagic cystitis     Mixed hyperlipidemia 1/9/2011    Nocturia 10/10/2011    OAB (overactive bladder)     Orchitis     Type II or unspecified type diabetes mellitus without mention of complication, not stated as uncontrolled 1/9/2011    UTI (urinary tract infection)       Past Surgical History:   Procedure Laterality Date    HX HEMORRHOIDECTOMY      HX HERNIA REPAIR      HX ORTHOPAEDIC      HX UROLOGICAL  05/15/12    [facility], PNBx - TRUS Vol 23.22 cc's, Benign, Dr Libra Gomez  1/6/16    PNBx-TRUS VOL 20.06cc's, Benign, pre-Bx PSA 10.07, Dr. Alvarado Mcgee     Current Outpatient Medications   Medication Sig Dispense Refill    BASAGLAR KWIKPEN U-100 INSULIN 100 unit/mL (3 mL) inpn INJECT 24 UNITS  BY SUBCUTANEOUS ROUTE Q MORNING  2    quinapril (ACCUPRIL) 40 mg tablet TAKE 2 TABLETS BY MOUTH EVERY  Tab 8    triamcinolone acetonide (KENALOG) 0.1 % topical cream Apply  to affected area three (3) times daily as needed for Skin Irritation. 84 g 11    carvedilol (COREG) 12.5 mg tablet TK 1 T PO  BID  0    ONETOUCH ULTRA TEST strip TEST FOUR TIMES DAILY AS DIRECTED 300 Strip 0    chlorthalidone (HYGROTEN) 25 mg tablet TK 1 T PO D  11    glucose blood VI test strips (ONETOUCH ULTRA TEST) strip by Does Not Apply route See Admin Instructions.  Dx. Code E11.9 300 Strip 3    SRIDEVI PEN NEEDLE 32 gauge x 5/32\" ndle   6    COMBIGAN 0.2-0.5 % drop ophthalmic solution   3    triamcinolone acetonide (KENALOG) 0.1 % topical cream Apply  to affected area two (2) times a day. use thin layer 85 g 1    Blood-Glucose Meter monitoring kit Test 4-5 times per day as needed 1 Kit 0    glucose blood VI test strips (BLOOD GLUCOSE TEST) strip Test 4-5 times per day as needed 150 Strip 11    sildenafil citrate (VIAGRA) 100 mg tablet Take 1 Tab by mouth as needed. 10 Tab 11    methocarbamol (ROBAXIN) 500 mg tablet Take 1 Tab by mouth four (4) times daily. (Patient not taking: Reported on 5/3/2019) 40 Tab 2    tadalafil (CIALIS) 20 mg tablet Take 1 Tab by mouth as needed. 48 Tab 1     No Known Allergies  Family History   Problem Relation Age of Onset    Diabetes Mother     Cancer Father      Social History     Tobacco Use    Smoking status: Former Smoker    Smokeless tobacco: Never Used   Substance Use Topics    Alcohol use: No     Patient Active Problem List   Diagnosis Code    Essential hypertension, benign I10    Mixed hyperlipidemia E78.2    Encounter for long-term (current) use of other medications Z79.899    Nocturia R35.1    ED (erectile dysfunction) N52.9    Allergic rhinitis, cause unspecified J30.9    Elevated PSA R97.20    OAB (overactive bladder) N32.81    Type 2 diabetes mellitus with diabetic nephropathy, with long-term current use of insulin ([facility]) E11.21, Z79.4    CKD (chronic kidney disease) stage 2, GFR 60-89 ml/min N18.2       Depression Risk Factor Screening:     3 most recent PHQ Screens 8/7/2019   Little interest or pleasure in doing things Not at all   Feeling down, depressed, irritable, or hopeless Not at all   Total Score PHQ 2 0     Alcohol Risk Factor Screening: You do not drink alcohol or very rarely. Functional Ability and Level of Safety:   Hearing Loss  Hearing is good.     Activities of Daily Living  The home contains: ayushb bars and smoke alarm  Patient does total self care    Fall Risk  Fall Risk Assessment, last 12 mths 8/7/2019   Able to walk? Yes   Fall in past 12 months? No       Abuse Screen  Patient is not abused    Cognitive Screening   Evaluation of Cognitive Function:  Has your family/caregiver stated any concerns about your memory: no  Normal    Patient Care Team   Patient Care Team:  Milagro Franklin MD as PCP - General  Shelton Howard MD (Ophthalmology)    Assessment/Plan   Education and counseling provided:  Are appropriate based on today's review and evaluation    Diagnoses and all orders for this visit:    1. Essential hypertension, benign    2. Type 2 diabetes mellitus with diabetic nephropathy, with long-term current use of insulin ([address]    3. CKD (chronic kidney disease) stage 2, GFR 60-89 ml/min    4.  Mixed hyperlipidemia        Health Maintenance Due   Topic Date Due    Shingrix Vaccine Age 50> (1 of 2) 04/17/2000    FOBT Q 1 YEAR AGE 50-75  04/17/2000    GLAUCOMA SCREENING Q2Y  07/22/2017    EYE EXAM RETINAL OR DILATED  07/22/2017    Pneumococcal 65+ years (2 of 2 - PPSV23) 10/11/2017    MICROALBUMIN Q1  03/03/2018    MEDICARE YEARLY EXAM  06/13/2018    HEMOGLOBIN A1C Q6M  07/02/2018    LIPID PANEL Q1  01/02/2019    FOOT EXAM Q1  01/12/2019    Influenza Age 5 to Adult  08/01/2019

## 2019-08-07 NOTE — PROGRESS NOTES
Chief Complaint   Patient presents with    Diabetes     F/U on diabetes.  Hypertension     F/U on BP.  Cholesterol Problem     F/U on cholesterol. 1. Have you been to the ER, urgent care clinic since your last visit? Hospitalized since your last visit? No    2. Have you seen or consulted any other health care providers outside of the [address] [place] [address] since your last visit? Include any pap smears or colon screening.  No

## 2019-10-02 ENCOUNTER — OFFICE VISIT (OUTPATIENT)
Dept: FAMILY MEDICINE CLINIC | Age: 69
End: 2019-10-02

## 2019-10-02 VITALS
RESPIRATION RATE: 18 BRPM | WEIGHT: 230 LBS | HEIGHT: 74 IN | TEMPERATURE: 98.9 F | HEART RATE: 82 BPM | SYSTOLIC BLOOD PRESSURE: 148 MMHG | BODY MASS INDEX: 29.52 KG/M2 | DIASTOLIC BLOOD PRESSURE: 85 MMHG | OXYGEN SATURATION: 98 %

## 2019-10-02 DIAGNOSIS — Z98.890 S/P LUMBAR LAMINECTOMY: ICD-10-CM

## 2019-10-02 DIAGNOSIS — H10.9 CONJUNCTIVITIS OF BOTH EYES, UNSPECIFIED CONJUNCTIVITIS TYPE: ICD-10-CM

## 2019-10-02 DIAGNOSIS — M54.41 ACUTE RIGHT-SIDED LOW BACK PAIN WITH RIGHT-SIDED SCIATICA: Primary | ICD-10-CM

## 2019-10-02 RX ORDER — METHOCARBAMOL 500 MG/1
500 TABLET, FILM COATED ORAL 4 TIMES DAILY
Qty: 40 TAB | Refills: 1 | Status: SHIPPED | OUTPATIENT
Start: 2019-10-02 | End: 2021-07-23 | Stop reason: ALTCHOICE

## 2019-10-02 RX ORDER — SULFACETAMIDE SODIUM 100 MG/ML
1 SOLUTION/ DROPS OPHTHALMIC EVERY 4 HOURS
Qty: 1 BOTTLE | Refills: 0 | Status: SHIPPED | OUTPATIENT
Start: 2019-10-02 | End: 2019-10-12

## 2019-10-02 RX ORDER — PREDNISONE 10 MG/1
10 TABLET ORAL 2 TIMES DAILY
Qty: 14 TAB | Refills: 0 | Status: SHIPPED | OUTPATIENT
Start: 2019-10-02 | End: 2020-11-09 | Stop reason: ALTCHOICE

## 2019-10-02 RX ORDER — METFORMIN HYDROCHLORIDE 500 MG/1
TABLET ORAL
COMMUNITY
End: 2022-03-22 | Stop reason: SDUPTHER

## 2019-10-02 NOTE — PROGRESS NOTES
HISTORY OF PRESENT ILLNESS  Leatha Leroy is a 71 y.o. male. 5 days worsening low back pain radiating to rt thigh. No apparent injury. Hadprevious lumbar laminectomy years ago. Red irruitarted eyes x 3 days ,to see ophthalmology soon,[facility]  Back Pain    The history is provided by the patient. This is a new problem. The current episode started more than 2 days ago. The problem has been gradually worsening. The problem occurs hourly. The pain is associated with no known injury. The pain is present in the lower back. The quality of the pain is described as aching. The pain radiates to the right thigh. The pain is at a severity of 6/10. The pain is moderate. The symptoms are aggravated by certain positions. Pertinent negatives include no fever, no paresthesias, no paresis, no tingling and no weakness. Red Eye   The history is provided by the patient. This is a new problem. The current episode started more than 2 days ago. The problem occurs daily. The problem has been gradually worsening. Review of Systems   Constitutional: Negative for chills and fever. HENT: Negative for hearing loss. Eyes: Positive for discharge and redness. Negative for blurred vision. Musculoskeletal: Positive for back pain. Neurological: Negative for tingling, weakness and paresthesias. Physical Exam   Constitutional: He appears well-developed. HENT:   Head: Normocephalic and atraumatic. Left Ear: External ear normal.   Nose: Nose normal.   Mouth/Throat: Oropharynx is clear and moist.   Cardiovascular: Normal rate and regular rhythm. Pulmonary/Chest: Effort normal.   Abdominal: Soft. Bowel sounds are normal.   Musculoskeletal:        Lumbar back: He exhibits decreased range of motion, tenderness and bony tenderness. He exhibits no deformity. Back:    SLRs 90/90,DTRs normal and symmetrical     Skin: Skin is warm and dry. ASSESSMENT and PLAN  Diagnoses and all orders for this visit:    1.  Acute right-sided low back pain with right-sided sciatica  -     XR SPINE LUMB 2 OR 3 V; Future  -     predniSONE (DELTASONE) 10 mg tablet; Take 10 mg by mouth two (2) times a day. -     methocarbamol (ROBAXIN) 500 mg tablet; Take 1 Tab by mouth four (4) times daily. 2. S/P lumbar laminectomy    3. Conjunctivitis of both eyes, unspecified conjunctivitis type  -     sulfacetamide (BLEPH-10) 10 % ophthalmic solution; Administer 1 Drop to both eyes every four (4) hours for 10 days. Follow-up and Dispositions    · Return if symptoms worsen or fail to improve.

## 2019-10-02 NOTE — PROGRESS NOTES
1. Have you been to the ER, urgent care clinic since your last visit? Hospitalized since your last visit?no    2. Have you seen or consulted any other health care providers outside of the [address] [place] [address] since your last visit? Include any pap smears or colon screening.  no  \

## 2019-10-07 DIAGNOSIS — E11.21 TYPE 2 DIABETES MELLITUS WITH DIABETIC NEPHROPATHY, WITH LONG-TERM CURRENT USE OF INSULIN (HCC): Primary | ICD-10-CM

## 2019-10-07 DIAGNOSIS — Z79.4 TYPE 2 DIABETES MELLITUS WITH DIABETIC NEPHROPATHY, WITH LONG-TERM CURRENT USE OF INSULIN (HCC): Primary | ICD-10-CM

## 2019-12-26 ENCOUNTER — TELEPHONE (OUTPATIENT)
Dept: INFECTIOUS DISEASES | Age: 69
End: 2019-12-26

## 2019-12-26 NOTE — TELEPHONE ENCOUNTER
Pt wants to be seen   Pt states he has a cold x 1 wk and doesn't want to get the flu     Pls call with advice     Best number to reach him is [phone]

## 2020-01-01 DIAGNOSIS — I10 ESSENTIAL HYPERTENSION, BENIGN: ICD-10-CM

## 2020-01-02 RX ORDER — QUINAPRIL 40 MG/1
TABLET ORAL
Qty: 180 TAB | Refills: 8 | Status: SHIPPED | OUTPATIENT
Start: 2020-01-02 | End: 2021-01-18

## 2020-09-30 ENCOUNTER — TELEPHONE (OUTPATIENT)
Dept: FAMILY MEDICINE CLINIC | Age: 70
End: 2020-09-30

## 2020-09-30 NOTE — TELEPHONE ENCOUNTER
----- Message from Ben Duong sent at 9/30/2020  2:44 PM EDT -----  Regarding: Dr Wayne Lorenzana / telephone  Caller's first and last name and relationship to patient (if not the patient): self. Best contact number: [phone]  Preferred date and time: Any. Reason for appointment: Stool issues. Details to clarify the request: Pt states that he has been having stool issues since yesterday 09/29. Says he's had issues awhile back but nothing that's been this problematic. Requesting to see Dr Wayne Lorenzana in person.

## 2020-09-30 NOTE — TELEPHONE ENCOUNTER
----- Message from Staci Delcid sent at 9/30/2020  2:42 PM EDT -----  Regarding: Dr Helio Johnson / refill  Caller (if not patient): self. Relationship of caller (if not patient): NA. Best contact number(s): [phone]  Name of medication and dosage if known: \"Carvederol\" 2.5mg (BP Med)  Is patient out of this medication (yes/no): Yes. Pharmacy name: [address] [place] [address]. Pharmacy listed in chart? (yes/no): Yes. Pharmacy phone number: [phone]  Date of last visit: 10/02/2019  Details to clarify the request: Pt requesting BP RX to be refilled. Says his kidney doctor denied it to be filled. Would like to know if he can have 90-day supply like he use to get, as opposed to 30-day.

## 2020-10-02 RX ORDER — CARVEDILOL 12.5 MG/1
TABLET ORAL
Qty: 60 TAB | Refills: 11 | Status: SHIPPED | OUTPATIENT
Start: 2020-10-02 | End: 2020-11-09 | Stop reason: DRUGHIGH

## 2020-11-09 ENCOUNTER — OFFICE VISIT (OUTPATIENT)
Dept: FAMILY MEDICINE CLINIC | Age: 70
End: 2020-11-09
Payer: MEDICARE

## 2020-11-09 VITALS
WEIGHT: 229.2 LBS | BODY MASS INDEX: 29.41 KG/M2 | HEIGHT: 74 IN | TEMPERATURE: 97.5 F | DIASTOLIC BLOOD PRESSURE: 100 MMHG | HEART RATE: 101 BPM | RESPIRATION RATE: 20 BRPM | OXYGEN SATURATION: 97 % | SYSTOLIC BLOOD PRESSURE: 142 MMHG

## 2020-11-09 DIAGNOSIS — N18.2 CKD (CHRONIC KIDNEY DISEASE) STAGE 2, GFR 60-89 ML/MIN: ICD-10-CM

## 2020-11-09 DIAGNOSIS — E11.21 TYPE 2 DIABETES MELLITUS WITH DIABETIC NEPHROPATHY, WITH LONG-TERM CURRENT USE OF INSULIN (HCC): ICD-10-CM

## 2020-11-09 DIAGNOSIS — Z98.890 S/P LUMBAR LAMINECTOMY: ICD-10-CM

## 2020-11-09 DIAGNOSIS — E78.2 MIXED HYPERLIPIDEMIA: ICD-10-CM

## 2020-11-09 DIAGNOSIS — I10 ESSENTIAL HYPERTENSION, BENIGN: ICD-10-CM

## 2020-11-09 DIAGNOSIS — Z79.4 TYPE 2 DIABETES MELLITUS WITH DIABETIC NEPHROPATHY, WITH LONG-TERM CURRENT USE OF INSULIN (HCC): ICD-10-CM

## 2020-11-09 DIAGNOSIS — Z23 ENCOUNTER FOR IMMUNIZATION: ICD-10-CM

## 2020-11-09 DIAGNOSIS — Z12.11 SCREEN FOR COLON CANCER: ICD-10-CM

## 2020-11-09 DIAGNOSIS — Z00.00 MEDICARE ANNUAL WELLNESS VISIT, SUBSEQUENT: Primary | ICD-10-CM

## 2020-11-09 PROCEDURE — 1101F PT FALLS ASSESS-DOCD LE1/YR: CPT | Performed by: FAMILY MEDICINE

## 2020-11-09 PROCEDURE — G0439 PPPS, SUBSEQ VISIT: HCPCS | Performed by: FAMILY MEDICINE

## 2020-11-09 PROCEDURE — 90732 PPSV23 VACC 2 YRS+ SUBQ/IM: CPT

## 2020-11-09 PROCEDURE — 2022F DILAT RTA XM EVC RTNOPTHY: CPT | Performed by: FAMILY MEDICINE

## 2020-11-09 PROCEDURE — 3046F HEMOGLOBIN A1C LEVEL >9.0%: CPT | Performed by: FAMILY MEDICINE

## 2020-11-09 PROCEDURE — G8753 SYS BP > OR = 140: HCPCS | Performed by: FAMILY MEDICINE

## 2020-11-09 PROCEDURE — G8510 SCR DEP NEG, NO PLAN REQD: HCPCS | Performed by: FAMILY MEDICINE

## 2020-11-09 PROCEDURE — G8427 DOCREV CUR MEDS BY ELIG CLIN: HCPCS | Performed by: FAMILY MEDICINE

## 2020-11-09 PROCEDURE — 3017F COLORECTAL CA SCREEN DOC REV: CPT | Performed by: FAMILY MEDICINE

## 2020-11-09 PROCEDURE — G8755 DIAS BP > OR = 90: HCPCS | Performed by: FAMILY MEDICINE

## 2020-11-09 PROCEDURE — G8536 NO DOC ELDER MAL SCRN: HCPCS | Performed by: FAMILY MEDICINE

## 2020-11-09 PROCEDURE — G8419 CALC BMI OUT NRM PARAM NOF/U: HCPCS | Performed by: FAMILY MEDICINE

## 2020-11-09 RX ORDER — CARVEDILOL 25 MG/1
25 TABLET ORAL 2 TIMES DAILY WITH MEALS
Qty: 180 TAB | Refills: 3 | Status: SHIPPED | OUTPATIENT
Start: 2020-11-09 | End: 2021-11-22 | Stop reason: ALTCHOICE

## 2020-11-09 NOTE — PROGRESS NOTES
HISTORY OF PRESENT ILLNESS  Chandrakant Jackson is a 79 y.o. male. f/u hbp,dm2 chol,elevated psa. He is to see Endo this week,sugars have been fairly well controlled,BP has been elevated. PSA is followed by [facility] and has pending appt. Feeling ok,though troubled by polyneuropathy  Neurologic Problem   The history is provided by the patient. This is a chronic problem. The problem has been gradually worsening. There was left lower extremity and right lower extremity focality noted. Primary symptoms include loss of sensation and loss of balance. Pertinent negatives include no nausea. Diabetes   The history is provided by the patient. This is a chronic problem. The problem occurs daily. The problem has not changed since onset. Hypertension    The history is provided by the patient. This is a chronic problem. The problem has not changed since onset. Pertinent negatives include no nausea. Review of Systems   Constitutional: Negative for fever. Gastrointestinal: Negative for blood in stool, constipation, heartburn, melena and nausea. Musculoskeletal: Negative for back pain and myalgias. Skin: Negative for rash. Neurological: Positive for tingling and loss of balance. Physical Exam  Constitutional:       Appearance: Normal appearance. HENT:      Head: Normocephalic and atraumatic. Nose: Nose normal.      Mouth/Throat:      Mouth: Mucous membranes are moist.   Neck:      Musculoskeletal: Normal range of motion and neck supple. Cardiovascular:      Rate and Rhythm: Normal rate and regular rhythm. Pulses: Normal pulses. Heart sounds: Normal heart sounds. Pulmonary:      Effort: Pulmonary effort is normal.      Breath sounds: Normal breath sounds. Abdominal:      General: Abdomen is flat. Palpations: Abdomen is soft. Skin:     General: Skin is warm and dry. Neurological:      General: No focal deficit present. Mental Status: He is alert and oriented to person, place, and time. Coordination: Coordination normal.         ASSESSMENT and PLAN  Diagnoses and all orders for this visit:    1. Medicare annual wellness visit, subsequent    2. Essential hypertension, benign;increase carvedolol to 25 bid  -     carvediloL (COREG) 25 mg tablet; Take 1 Tab by mouth two (2) times daily (with meals). 3. Type 2 diabetes mellitus with diabetic nephropathy, with long-term current use of insulin (HCC),f/u with Endo    4. CKD (chronic kidney disease) stage 2, GFR 60-89 ml/min    5. Mixed hyperlipidemia    6. S/P lumbar laminectomy    7. Screen for colon cancer  -     OCCULT BLOOD IMMUNOASSAY,DIAGNOSTIC    8. Encounter for immunization  -     PNEUMOCOCCAL POLYSACCHARIDE VACCINE, 23-VALENT, ADULT OR IMMUNOSUPPRESSED PT DOSE,      Follow-up and Dispositions    · Return in about 6 months (around 5/9/2021).

## 2020-11-09 NOTE — PROGRESS NOTES
Chief Complaint   Patient presents with    Annual Wellness Visit     medicare wellness    Neurologic Problem     unsteady gait since January 1. Have you been to the ER, urgent care clinic since your last visit? Hospitalized since your last visit? Yes, [facility], bit lip    2. Have you seen or consulted any other health care providers outside of the [address] [place] [address] since your last visit? Include any pap smears or colon screening.  no

## 2020-11-09 NOTE — PROGRESS NOTES
This is the Subsequent Medicare Annual Wellness Exam, performed 12 months or more after the Initial AWV or the last Subsequent AWV    I have reviewed the patient's medical history in detail and updated the computerized patient record. History     Patient Active Problem List   Diagnosis Code    Essential hypertension, benign I10    Mixed hyperlipidemia E78.2    Encounter for long-term (current) use of other medications Z79.899    Nocturia R35.1    ED (erectile dysfunction) N52.9    Allergic rhinitis, cause unspecified J30.9    Elevated PSA R97.20    OAB (overactive bladder) N32.81    Type 2 diabetes mellitus with diabetic nephropathy, with long-term current use of insulin (HCC) E11.21, Z79.4    CKD (chronic kidney disease) stage 2, GFR 60-89 ml/min N18.2     Past Medical History:   Diagnosis Date    Allergic rhinitis, cause unspecified 2/12/2013    CKD (chronic kidney disease) stage 2, GFR 60-89 ml/min 11/3/2016    Dysuria     ED (erectile dysfunction) 12/2/2011    Elevated PSA     benign PNBx 2012, 1/6/2016    Encounter for long-term (current) use of other medications 1/9/2011    Essential hypertension, benign 1/9/2011    Gross hematuria     Hemorrhagic cystitis     Mixed hyperlipidemia 1/9/2011    Nocturia 10/10/2011    OAB (overactive bladder)     Orchitis     Type II or unspecified type diabetes mellitus without mention of complication, not stated as uncontrolled 1/9/2011    UTI (urinary tract infection)       Past Surgical History:   Procedure Laterality Date    HX HEMORRHOIDECTOMY      HX HERNIA REPAIR      HX ORTHOPAEDIC      HX UROLOGICAL  05/15/12    [facility], PNBx - TRUS Vol 23.22 cc's, Benign, Dr Ronnie Walden  1/6/16    PNBx-TRUS VOL 20.06cc's, Benign, pre-Bx PSA 10.07, Dr. Jeramy Kelley     Current Outpatient Medications   Medication Sig Dispense Refill    carvediloL (COREG) 25 mg tablet Take 1 Tab by mouth two (2) times daily (with meals).  180 Tab 3    quinapril (ACCUPRIL) 40 mg tablet TAKE 2 TABLETS BY MOUTH EVERY  Tab 8    metFORMIN (GLUCOPHAGE) 500 mg tablet Take  by mouth two (2) times daily (with meals).  BASAGLAMBROSIO WALTERPEN U-100 INSULIN 100 unit/mL (3 mL) inpn INJECT 24 UNITS  BY SUBCUTANEOUS ROUTE Q MORNING  2    ONETOUCH ULTRA TEST strip TEST FOUR TIMES DAILY AS DIRECTED 300 Strip 0    chlorthalidone (HYGROTEN) 25 mg tablet TK 1 T PO D  11    glucose blood VI test strips (ONETOUCH ULTRA TEST) strip by Does Not Apply route See Admin Instructions. Dx. Code E11.9 300 Strip 3    triamcinolone acetonide (KENALOG) 0.1 % topical cream Apply  to affected area two (2) times a day. use thin layer 85 g 1    Blood-Glucose Meter monitoring kit Test 4-5 times per day as needed 1 Kit 0    glucose blood VI test strips (BLOOD GLUCOSE TEST) strip Test 4-5 times per day as needed 150 Strip 11    methocarbamol (ROBAXIN) 500 mg tablet Take 1 Tab by mouth four (4) times daily. 40 Tab 1    sildenafil citrate (VIAGRA) 100 mg tablet Take 1 Tab by mouth as needed (ED). 10 Tab 11    tadalafil (CIALIS) 20 mg tablet Take 1 Tab by mouth as needed.  50 Tab 1    SRIDEVI PEN NEEDLE 32 gauge x 5/32\" ndle   6    COMBIGAN 0.2-0.5 % drop ophthalmic solution   3     No Known Allergies    Family History   Problem Relation Age of Onset    Diabetes Mother     Cancer Father      Social History     Tobacco Use    Smoking status: Former Smoker    Smokeless tobacco: Never Used   Substance Use Topics    Alcohol use: No       Depression Risk Factor Screening:     3 most recent PHQ Screens 11/9/2020   Little interest or pleasure in doing things Not at all   Feeling down, depressed, irritable, or hopeless Not at all   Total Score PHQ 2 0       Alcohol Risk Screen   Do you average more than 1 drink per night or more than 7 drinks a week: No    In the past three months have you have had more than 4 drinks containing alcohol on one occasion: No        Functional Ability and Level of Safety:   Hearing: Hearing is good. Activities of Daily Living: The home contains: no safety equipment. Patient does total self care     Ambulation: with mild difficulty     Fall Risk:  Fall Risk Assessment, last 12 mths 2020   Able to walk? Yes   Fall in past 12 months? No     Abuse Screen:  Patient is not abused       Cognitive Screening   Has your family/caregiver stated any concerns about your memory: no     Cognitive Screenin    Patient Care Team   Patient Care Team:  Ekaterina Bolton MD as PCP - General  Ekaterina Bolton MD as PCP - [facility] Empaneled Provider  Rita Mark MD (Ophthalmology)    Assessment/Plan   Education and counseling provided:  Are appropriate based on today's review and evaluation    Diagnoses and all orders for this visit:    1. Medicare annual wellness visit, subsequent    2. Essential hypertension, benign  -     carvediloL (COREG) 25 mg tablet; Take 1 Tab by mouth two (2) times daily (with meals). 3. Type 2 diabetes mellitus with diabetic nephropathy, with long-term current use of insulin ([address]    4. CKD (chronic kidney disease) stage 2, GFR 60-89 ml/min    5. Mixed hyperlipidemia    6. S/P lumbar laminectomy    7.  Screen for colon cancer  -     OCCULT BLOOD IMMUNOASSAY,DIAGNOSTIC        Health Maintenance Due   Topic Date Due    Shingrix Vaccine Age 49> (1 of 2) 2000    Colorectal Cancer Screening Combo  2000    Pneumococcal 65+ years (2 of 2 - PPSV23) 10/11/2017    A1C test (Diabetic or Prediabetic)  2017    MICROALBUMIN Q1  2018    Medicare Yearly Exam  2018    Lipid Screen  2019    Foot Exam Q1  2019    Flu Vaccine (1) 2020

## 2021-01-15 ENCOUNTER — HOSPITAL ENCOUNTER (OUTPATIENT)
Dept: LAB | Age: 71
Discharge: HOME | End: 2021-01-15
Payer: MEDICARE

## 2021-01-15 ENCOUNTER — TRANSCRIBE ORDER (OUTPATIENT)
Dept: REGISTRATION | Age: 71
End: 2021-01-15

## 2021-01-15 DIAGNOSIS — N18.9 CHRONIC KIDNEY DISEASE, UNSPECIFIED: ICD-10-CM

## 2021-01-15 DIAGNOSIS — E11.29 TYPE II OR UNSPECIFIED TYPE DIABETES MELLITUS WITH RENAL MANIFESTATIONS, UNCONTROLLED(250.42) (HCC): ICD-10-CM

## 2021-01-15 DIAGNOSIS — E11.29 TYPE II OR UNSPECIFIED TYPE DIABETES MELLITUS WITH RENAL MANIFESTATIONS, UNCONTROLLED(250.42) (HCC): Primary | ICD-10-CM

## 2021-01-15 DIAGNOSIS — I10 ESSENTIAL HYPERTENSION, MALIGNANT: ICD-10-CM

## 2021-01-15 DIAGNOSIS — E11.65 TYPE II OR UNSPECIFIED TYPE DIABETES MELLITUS WITH RENAL MANIFESTATIONS, UNCONTROLLED(250.42) (HCC): Primary | ICD-10-CM

## 2021-01-15 DIAGNOSIS — E11.65 TYPE II OR UNSPECIFIED TYPE DIABETES MELLITUS WITH RENAL MANIFESTATIONS, UNCONTROLLED(250.42) (HCC): ICD-10-CM

## 2021-01-15 LAB
ANION GAP SERPL CALC-SCNC: 3 MMOL/L (ref 3–18)
BUN SERPL-MCNC: 17 MG/DL (ref 7–18)
BUN/CREAT SERPL: 12 (ref 12–20)
CALCIUM SERPL-MCNC: 8.9 MG/DL (ref 8.5–10.1)
CHLORIDE SERPL-SCNC: 97 MMOL/L (ref 100–111)
CO2 SERPL-SCNC: 33 MMOL/L (ref 21–32)
CREAT SERPL-MCNC: 1.4 MG/DL (ref 0.6–1.3)
EST. AVERAGE GLUCOSE BLD GHB EST-MCNC: 186 MG/DL
GLUCOSE SERPL-MCNC: 209 MG/DL (ref 74–99)
HBA1C MFR BLD: 8.1 % (ref 4.2–5.6)
POTASSIUM SERPL-SCNC: 3.9 MMOL/L (ref 3.5–5.5)
SODIUM SERPL-SCNC: 133 MMOL/L (ref 136–145)

## 2021-01-15 PROCEDURE — 83036 HEMOGLOBIN GLYCOSYLATED A1C: CPT

## 2021-01-15 PROCEDURE — 84206 ASSAY OF PROINSULIN: CPT

## 2021-01-15 PROCEDURE — 84681 ASSAY OF C-PEPTIDE: CPT

## 2021-01-15 PROCEDURE — 80048 BASIC METABOLIC PNL TOTAL CA: CPT

## 2021-01-15 PROCEDURE — 36415 COLL VENOUS BLD VENIPUNCTURE: CPT

## 2021-01-16 LAB — C PEPTIDE SERPL-MCNC: 2.1 NG/ML (ref 1.1–4.4)

## 2021-01-18 LAB — PROINSULIN SERPL-SCNC: 11.1 PMOL/L (ref 0–10)

## 2021-07-23 ENCOUNTER — OFFICE VISIT (OUTPATIENT)
Dept: FAMILY MEDICINE CLINIC | Age: 71
End: 2021-07-23
Payer: MEDICARE

## 2021-07-23 VITALS
TEMPERATURE: 98.4 F | SYSTOLIC BLOOD PRESSURE: 162 MMHG | WEIGHT: 221.2 LBS | OXYGEN SATURATION: 99 % | BODY MASS INDEX: 28.39 KG/M2 | HEIGHT: 74 IN | DIASTOLIC BLOOD PRESSURE: 104 MMHG | RESPIRATION RATE: 18 BRPM | HEART RATE: 76 BPM

## 2021-07-23 DIAGNOSIS — Z12.11 SCREEN FOR COLON CANCER: ICD-10-CM

## 2021-07-23 DIAGNOSIS — E11.21 TYPE 2 DIABETES MELLITUS WITH DIABETIC NEPHROPATHY, WITH LONG-TERM CURRENT USE OF INSULIN (HCC): ICD-10-CM

## 2021-07-23 DIAGNOSIS — L30.9 ECZEMA, UNSPECIFIED TYPE: ICD-10-CM

## 2021-07-23 DIAGNOSIS — Z79.4 TYPE 2 DIABETES MELLITUS WITH DIABETIC NEPHROPATHY, WITH LONG-TERM CURRENT USE OF INSULIN (HCC): ICD-10-CM

## 2021-07-23 DIAGNOSIS — N18.2 CKD (CHRONIC KIDNEY DISEASE) STAGE 2, GFR 60-89 ML/MIN: ICD-10-CM

## 2021-07-23 DIAGNOSIS — E78.2 MIXED HYPERLIPIDEMIA: ICD-10-CM

## 2021-07-23 DIAGNOSIS — N52.9 ERECTILE DYSFUNCTION, UNSPECIFIED ERECTILE DYSFUNCTION TYPE: ICD-10-CM

## 2021-07-23 DIAGNOSIS — R97.20 ELEVATED PSA: ICD-10-CM

## 2021-07-23 DIAGNOSIS — B35.1 ONYCHOMYCOSIS: ICD-10-CM

## 2021-07-23 DIAGNOSIS — I10 ESSENTIAL HYPERTENSION, BENIGN: Primary | ICD-10-CM

## 2021-07-23 DIAGNOSIS — Z98.890 S/P LUMBAR LAMINECTOMY: ICD-10-CM

## 2021-07-23 LAB
ALBUMIN SERPL-MCNC: 4.1 G/DL (ref 3.5–5)
ALBUMIN/GLOB SERPL: 1 {RATIO} (ref 1.1–2.2)
ALP SERPL-CCNC: 82 U/L (ref 45–117)
ALT SERPL-CCNC: 31 U/L (ref 12–78)
ANION GAP SERPL CALC-SCNC: 5 MMOL/L (ref 5–15)
AST SERPL-CCNC: 22 U/L (ref 15–37)
BASOPHILS # BLD: 0 K/UL (ref 0–0.1)
BASOPHILS NFR BLD: 1 % (ref 0–1)
BILIRUB SERPL-MCNC: 0.4 MG/DL (ref 0.2–1)
BUN SERPL-MCNC: 12 MG/DL (ref 6–20)
BUN/CREAT SERPL: 9 (ref 12–20)
CALCIUM SERPL-MCNC: 9.2 MG/DL (ref 8.5–10.1)
CHLORIDE SERPL-SCNC: 99 MMOL/L (ref 97–108)
CHOLEST SERPL-MCNC: 179 MG/DL
CO2 SERPL-SCNC: 29 MMOL/L (ref 21–32)
CREAT SERPL-MCNC: 1.32 MG/DL (ref 0.7–1.3)
DIFFERENTIAL METHOD BLD: ABNORMAL
EOSINOPHIL # BLD: 0.2 K/UL (ref 0–0.4)
EOSINOPHIL NFR BLD: 2 % (ref 0–7)
ERYTHROCYTE [DISTWIDTH] IN BLOOD BY AUTOMATED COUNT: 12.9 % (ref 11.5–14.5)
EST. AVERAGE GLUCOSE BLD GHB EST-MCNC: 203 MG/DL
GLOBULIN SER CALC-MCNC: 4.3 G/DL (ref 2–4)
GLUCOSE SERPL-MCNC: 225 MG/DL (ref 65–100)
HBA1C MFR BLD: 8.7 % (ref 4–5.6)
HCT VFR BLD AUTO: 42 % (ref 36.6–50.3)
HDLC SERPL-MCNC: 43 MG/DL
HDLC SERPL: 4.2 {RATIO} (ref 0–5)
HGB BLD-MCNC: 13.7 G/DL (ref 12.1–17)
IMM GRANULOCYTES # BLD AUTO: 0 K/UL (ref 0–0.04)
IMM GRANULOCYTES NFR BLD AUTO: 1 % (ref 0–0.5)
LDLC SERPL CALC-MCNC: 115.4 MG/DL (ref 0–100)
LYMPHOCYTES # BLD: 1.7 K/UL (ref 0.8–3.5)
LYMPHOCYTES NFR BLD: 25 % (ref 12–49)
MCH RBC QN AUTO: 28 PG (ref 26–34)
MCHC RBC AUTO-ENTMCNC: 32.6 G/DL (ref 30–36.5)
MCV RBC AUTO: 85.7 FL (ref 80–99)
MONOCYTES # BLD: 0.6 K/UL (ref 0–1)
MONOCYTES NFR BLD: 8 % (ref 5–13)
NEUTS SEG # BLD: 4.2 K/UL (ref 1.8–8)
NEUTS SEG NFR BLD: 63 % (ref 32–75)
NRBC # BLD: 0 K/UL (ref 0–0.01)
NRBC BLD-RTO: 0 PER 100 WBC
PLATELET # BLD AUTO: 232 K/UL (ref 150–400)
PMV BLD AUTO: 9.8 FL (ref 8.9–12.9)
POTASSIUM SERPL-SCNC: 3.8 MMOL/L (ref 3.5–5.1)
PROT SERPL-MCNC: 8.4 G/DL (ref 6.4–8.2)
RBC # BLD AUTO: 4.9 M/UL (ref 4.1–5.7)
SODIUM SERPL-SCNC: 133 MMOL/L (ref 136–145)
TRIGL SERPL-MCNC: 103 MG/DL (ref ?–150)
VLDLC SERPL CALC-MCNC: 20.6 MG/DL
WBC # BLD AUTO: 6.6 K/UL (ref 4.1–11.1)

## 2021-07-23 PROCEDURE — G8755 DIAS BP > OR = 90: HCPCS | Performed by: FAMILY MEDICINE

## 2021-07-23 PROCEDURE — G8510 SCR DEP NEG, NO PLAN REQD: HCPCS | Performed by: FAMILY MEDICINE

## 2021-07-23 PROCEDURE — G8753 SYS BP > OR = 140: HCPCS | Performed by: FAMILY MEDICINE

## 2021-07-23 PROCEDURE — G0463 HOSPITAL OUTPT CLINIC VISIT: HCPCS | Performed by: FAMILY MEDICINE

## 2021-07-23 PROCEDURE — 3017F COLORECTAL CA SCREEN DOC REV: CPT | Performed by: FAMILY MEDICINE

## 2021-07-23 PROCEDURE — G8419 CALC BMI OUT NRM PARAM NOF/U: HCPCS | Performed by: FAMILY MEDICINE

## 2021-07-23 PROCEDURE — G8427 DOCREV CUR MEDS BY ELIG CLIN: HCPCS | Performed by: FAMILY MEDICINE

## 2021-07-23 PROCEDURE — 99214 OFFICE O/P EST MOD 30 MIN: CPT | Performed by: FAMILY MEDICINE

## 2021-07-23 PROCEDURE — 3052F HG A1C>EQUAL 8.0%<EQUAL 9.0%: CPT | Performed by: FAMILY MEDICINE

## 2021-07-23 PROCEDURE — 1101F PT FALLS ASSESS-DOCD LE1/YR: CPT | Performed by: FAMILY MEDICINE

## 2021-07-23 PROCEDURE — 2022F DILAT RTA XM EVC RTNOPTHY: CPT | Performed by: FAMILY MEDICINE

## 2021-07-23 PROCEDURE — G8536 NO DOC ELDER MAL SCRN: HCPCS | Performed by: FAMILY MEDICINE

## 2021-07-23 RX ORDER — TERBINAFINE HYDROCHLORIDE 250 MG/1
250 TABLET ORAL DAILY
Qty: 30 TABLET | Refills: 2 | Status: SHIPPED | OUTPATIENT
Start: 2021-07-23 | End: 2021-11-22 | Stop reason: ALTCHOICE

## 2021-07-23 RX ORDER — DILTIAZEM HYDROCHLORIDE 120 MG/1
120 CAPSULE, COATED, EXTENDED RELEASE ORAL DAILY
Qty: 30 CAPSULE | Refills: 11 | Status: SHIPPED | OUTPATIENT
Start: 2021-07-23 | End: 2021-11-22 | Stop reason: ALTCHOICE

## 2021-07-23 RX ORDER — TRIAMCINOLONE ACETONIDE 1 MG/G
CREAM TOPICAL 2 TIMES DAILY
Qty: 85 G | Refills: 5 | Status: SHIPPED | OUTPATIENT
Start: 2021-07-23 | End: 2021-11-22 | Stop reason: SDUPTHER

## 2021-07-23 NOTE — PROGRESS NOTES
HISTORY OF PRESENT ILLNESS  Yaw Barrios is a 70 y.o. male. f/u hbp,chol,dm managed by endo,currently on metformin bid. Diabetes  The history is provided by the patient. This is a chronic problem. The problem occurs daily. The problem has been gradually improving. Pertinent negatives include no chest pain, no headaches and no shortness of breath. Hypertension   The history is provided by the patient. This is a chronic problem. The problem has been gradually improving. Pertinent negatives include no chest pain, no orthopnea, no palpitations, no malaise/fatigue, no blurred vision, no headaches, no dizziness, no shortness of breath and no nausea. Cholesterol Problem  The history is provided by the patient. This is a chronic problem. The problem occurs daily. The problem has not changed since onset. Pertinent negatives include no chest pain, no headaches and no shortness of breath. Foot Pain  The history is provided by the patient. This is a chronic problem. The problem occurs daily. Pertinent negatives include no chest pain, no headaches and no shortness of breath. Review of Systems   Constitutional: Negative for malaise/fatigue. HENT: Negative for congestion and sore throat. Eyes: Negative for blurred vision. Respiratory: Negative for cough, shortness of breath, wheezing and stridor. Cardiovascular: Negative for chest pain, palpitations and orthopnea. Gastrointestinal: Negative for diarrhea, heartburn and nausea. Genitourinary: Negative for frequency and urgency. Musculoskeletal: Negative for back pain. Skin: Positive for rash. Neurological: Negative for dizziness, focal weakness and headaches. Psychiatric/Behavioral: The patient does not have insomnia. Physical Exam  Constitutional:       Appearance: Normal appearance. He is normal weight. HENT:      Head: Normocephalic and atraumatic.       Right Ear: Tympanic membrane normal.      Left Ear: Tympanic membrane normal. Nose: Nose normal.      Mouth/Throat:      Mouth: Mucous membranes are moist.   Eyes:      Pupils: Pupils are equal, round, and reactive to light. Cardiovascular:      Rate and Rhythm: Normal rate and regular rhythm. Pulses: Normal pulses. Heart sounds: Normal heart sounds. Pulmonary:      Effort: Pulmonary effort is normal.      Breath sounds: Normal breath sounds. Abdominal:      General: Abdomen is flat. Palpations: Abdomen is soft. Genitourinary:     Prostate: Normal.      Rectum: Normal. Guaiac result negative. Musculoskeletal:      Cervical back: Normal range of motion and neck supple. Skin:     General: Skin is warm and dry. Comments: Comprehensive Diabetic Foot Exam  was performed     Neurological:      General: No focal deficit present. Mental Status: He is alert and oriented to person, place, and time. ASSESSMENT and PLAN  Diagnoses and all orders for this visit:    1. Essential hypertension, benign  -     METABOLIC PANEL, COMPREHENSIVE; Future  -     CBC WITH AUTOMATED DIFF; Future  -     dilTIAZem ER (CARDIZEM CD) 120 mg capsule; Take 1 Capsule by mouth daily. 2. Type 2 diabetes mellitus with diabetic nephropathy, with long-term current use of insulin (HCC)  -     HEMOGLOBIN A1C WITH EAG; Future    3. CKD (chronic kidney disease) stage 2, GFR 60-89 ml/min    4. Mixed hyperlipidemia  -     LIPID PANEL; Future    5. S/P lumbar laminectomy    6. Screen for colon cancer  -     REFERRAL TO GASTROENTEROLOGY    7. Erectile dysfunction, unspecified erectile dysfunction type    8. Elevated PSA    9. Onychomycosis  -     terbinafine HCL (LAMISIL) 250 mg tablet; Take 1 Tablet by mouth daily. 10. Eczema, unspecified type  -     triamcinolone acetonide (KENALOG) 0.1 % topical cream; Apply  to affected area two (2) times a day. use thin layer      Follow-up and Dispositions    · Return in about 4 weeks (around 8/20/2021).

## 2021-07-23 NOTE — PROGRESS NOTES
Chief Complaint   Patient presents with    Diabetes     F/U on diabetes.  Hypertension     F/U on BP.  Cholesterol Problem     F/U on cholesterol. 1. Have you been to the ER, urgent care clinic since your last visit? Hospitalized since your last visit? No    2. Have you seen or consulted any other health care providers outside of the [address] [place] [address] since your last visit? Include any pap smears or colon screening.  No

## 2021-07-31 NOTE — PROGRESS NOTES
HISTORY OF PRESENT ILLNESS  Betty Boston is a 71 y.o. male. F/U DM2 ,now on  Basagular   per Endo with improving control. Followed by Renal,stable ckd. Doing well  Diabetes  The history is provided by the patient. This is a chronic problem. The problem occurs daily. The problem has not changed since onset. Pertinent negatives include no chest pain and no shortness of breath. Hypertension   This is a chronic problem. The problem has not changed since onset. Associated symptoms include malaise/fatigue and peripheral edema. Pertinent negatives include no chest pain, no orthopnea and no shortness of breath. Cholesterol Problem  This is a chronic problem. The problem occurs daily. Pertinent negatives include no chest pain and no shortness of breath. Review of Systems   Constitutional:. Negative for fever. ,malaise   Respiratory: Negative for cough and shortness of breath. Cardiovascular: Positive for mild leg swelling. Negative for chest pain and orthopnea. Physical Exam   Constitutional: He appears well-nourished. HENT:   Head: Normocephalic and atraumatic. Right Ear: External ear normal.   Left Ear: External ear normal.   Mouth/Throat: Oropharynx is clear and moist.   Eyes: Conjunctivae are normal. Pupils are equal, round, and reactive to light. Neck: Normal range of motion. Neck supple. Pulmonary/Chest: Effort normal and breath sounds normal.   Abdominal: Soft. Bowel sounds are normal.   Skin: Skin is warm. Diagnoses and all orders for this visit:    1. Medicare annual wellness visit, subsequent    2. Essential hypertension, benign    3. Type 2 diabetes mellitus with diabetic nephropathy, with long-term current use of insulin ([address]    4. CKD (chronic kidney disease) stage 2, GFR 60-89 ml/min    5. Mixed hyperlipidemia    6. Erectile dysfunction, unspecified erectile dysfunction type  -     sildenafil citrate (VIAGRA) 100 mg tablet; Take 1 Tab by mouth as needed (ED).       Follow-up and Problem: Pain:  Goal: Pain level will decrease  Description: Pain level will decrease  7/31/2021 0945 by Adilene Fong RN  Outcome: Ongoing  7/31/2021 0215 by Franklyn Coffey RN  Outcome: Ongoing  Goal: Control of acute pain  Description: Control of acute pain  7/31/2021 0945 by Adilene Aleman RN  Outcome: Ongoing  7/31/2021 0215 by Franklyn Coffey RN  Outcome: Ongoing  Goal: Control of chronic pain  Description: Control of chronic pain  7/31/2021 0945 by Adilene Fong RN  Outcome: Ongoing  7/31/2021 0215 by Franklyn Coffey RN  Outcome: Ongoing     Problem: Falls - Risk of:  Goal: Will remain free from falls  Description: Will remain free from falls  7/31/2021 0945 by Adilene Aleman RN  Outcome: Ongoing  7/31/2021 0215 by Franklyn Coffey RN  Outcome: Ongoing  Goal: Absence of physical injury  Description: Absence of physical injury  7/31/2021 0945 by Adilene Fong RN  Outcome: Ongoing  7/31/2021 0215 by Franklyn Coffey RN  Outcome: Ongoing     Problem: Skin Integrity:  Goal: Will show no infection signs and symptoms  Description: Will show no infection signs and symptoms  7/31/2021 0945 by Adilene Aleman RN  Outcome: Ongoing  7/31/2021 0215 by Franklyn Coffey RN  Outcome: Ongoing  Goal: Absence of new skin breakdown  Description: Absence of new skin breakdown  7/31/2021 0945 by Adilene Fong RN  Outcome: Ongoing  7/31/2021 0215 by Franklyn Coffey RN  Outcome: Ongoing Dispositions    · Return in about 3 months (around 11/7/2019).

## 2021-11-22 ENCOUNTER — OFFICE VISIT (OUTPATIENT)
Dept: FAMILY MEDICINE CLINIC | Age: 71
End: 2021-11-22
Payer: MEDICARE

## 2021-11-22 VITALS
HEART RATE: 86 BPM | RESPIRATION RATE: 20 BRPM | HEIGHT: 74 IN | TEMPERATURE: 98.7 F | SYSTOLIC BLOOD PRESSURE: 160 MMHG | BODY MASS INDEX: 27.85 KG/M2 | DIASTOLIC BLOOD PRESSURE: 110 MMHG | OXYGEN SATURATION: 99 % | WEIGHT: 217 LBS

## 2021-11-22 DIAGNOSIS — R26.89 MULTIFACTORIAL GAIT DISORDER: ICD-10-CM

## 2021-11-22 DIAGNOSIS — E11.42 DIABETIC POLYNEUROPATHY ASSOCIATED WITH TYPE 2 DIABETES MELLITUS (HCC): ICD-10-CM

## 2021-11-22 DIAGNOSIS — I10 ESSENTIAL HYPERTENSION, BENIGN: Primary | ICD-10-CM

## 2021-11-22 DIAGNOSIS — Z79.4 TYPE 2 DIABETES MELLITUS WITH DIABETIC NEPHROPATHY, WITH LONG-TERM CURRENT USE OF INSULIN (HCC): ICD-10-CM

## 2021-11-22 DIAGNOSIS — N18.2 CKD (CHRONIC KIDNEY DISEASE) STAGE 2, GFR 60-89 ML/MIN: ICD-10-CM

## 2021-11-22 DIAGNOSIS — E78.2 MIXED HYPERLIPIDEMIA: ICD-10-CM

## 2021-11-22 DIAGNOSIS — E11.21 TYPE 2 DIABETES MELLITUS WITH DIABETIC NEPHROPATHY, WITH LONG-TERM CURRENT USE OF INSULIN (HCC): ICD-10-CM

## 2021-11-22 DIAGNOSIS — E11.42 TYPE 2 DIABETES MELLITUS WITH DIABETIC POLYNEUROPATHY, WITHOUT LONG-TERM CURRENT USE OF INSULIN (HCC): ICD-10-CM

## 2021-11-22 DIAGNOSIS — Z98.890 S/P LUMBAR LAMINECTOMY: ICD-10-CM

## 2021-11-22 LAB
ALBUMIN SERPL-MCNC: 4.3 G/DL (ref 3.5–5)
ALBUMIN/GLOB SERPL: 1 {RATIO} (ref 1.1–2.2)
ALP SERPL-CCNC: 83 U/L (ref 45–117)
ALT SERPL-CCNC: 27 U/L (ref 12–78)
ANION GAP SERPL CALC-SCNC: 5 MMOL/L (ref 5–15)
AST SERPL-CCNC: 17 U/L (ref 15–37)
BILIRUB SERPL-MCNC: 0.4 MG/DL (ref 0.2–1)
BUN SERPL-MCNC: 18 MG/DL (ref 6–20)
BUN/CREAT SERPL: 13 (ref 12–20)
CALCIUM SERPL-MCNC: 9.3 MG/DL (ref 8.5–10.1)
CHLORIDE SERPL-SCNC: 97 MMOL/L (ref 97–108)
CHOLEST SERPL-MCNC: 152 MG/DL
CO2 SERPL-SCNC: 29 MMOL/L (ref 21–32)
CREAT SERPL-MCNC: 1.41 MG/DL (ref 0.7–1.3)
CREAT UR-MCNC: 92.6 MG/DL
GLOBULIN SER CALC-MCNC: 4.4 G/DL (ref 2–4)
GLUCOSE SERPL-MCNC: 197 MG/DL (ref 65–100)
HBA1C MFR BLD HPLC: 8.8 %
HDLC SERPL-MCNC: 35 MG/DL
HDLC SERPL: 4.3 {RATIO} (ref 0–5)
LDLC SERPL CALC-MCNC: 96.6 MG/DL (ref 0–100)
MICROALBUMIN UR-MCNC: 3.48 MG/DL
MICROALBUMIN/CREAT UR-RTO: 38 MG/G (ref 0–30)
POTASSIUM SERPL-SCNC: 3.9 MMOL/L (ref 3.5–5.1)
PROT SERPL-MCNC: 8.7 G/DL (ref 6.4–8.2)
SODIUM SERPL-SCNC: 131 MMOL/L (ref 136–145)
TRIGL SERPL-MCNC: 102 MG/DL (ref ?–150)
UR CULT HOLD, URHOLD: NORMAL
VLDLC SERPL CALC-MCNC: 20.4 MG/DL

## 2021-11-22 PROCEDURE — G8419 CALC BMI OUT NRM PARAM NOF/U: HCPCS | Performed by: FAMILY MEDICINE

## 2021-11-22 PROCEDURE — G8755 DIAS BP > OR = 90: HCPCS | Performed by: FAMILY MEDICINE

## 2021-11-22 PROCEDURE — 2022F DILAT RTA XM EVC RTNOPTHY: CPT | Performed by: FAMILY MEDICINE

## 2021-11-22 PROCEDURE — G8427 DOCREV CUR MEDS BY ELIG CLIN: HCPCS | Performed by: FAMILY MEDICINE

## 2021-11-22 PROCEDURE — G0463 HOSPITAL OUTPT CLINIC VISIT: HCPCS | Performed by: FAMILY MEDICINE

## 2021-11-22 PROCEDURE — 1101F PT FALLS ASSESS-DOCD LE1/YR: CPT | Performed by: FAMILY MEDICINE

## 2021-11-22 PROCEDURE — G8536 NO DOC ELDER MAL SCRN: HCPCS | Performed by: FAMILY MEDICINE

## 2021-11-22 PROCEDURE — 99214 OFFICE O/P EST MOD 30 MIN: CPT | Performed by: FAMILY MEDICINE

## 2021-11-22 PROCEDURE — G8510 SCR DEP NEG, NO PLAN REQD: HCPCS | Performed by: FAMILY MEDICINE

## 2021-11-22 PROCEDURE — 3017F COLORECTAL CA SCREEN DOC REV: CPT | Performed by: FAMILY MEDICINE

## 2021-11-22 PROCEDURE — 83036 HEMOGLOBIN GLYCOSYLATED A1C: CPT | Performed by: FAMILY MEDICINE

## 2021-11-22 PROCEDURE — 3052F HG A1C>EQUAL 8.0%<EQUAL 9.0%: CPT | Performed by: FAMILY MEDICINE

## 2021-11-22 PROCEDURE — G8753 SYS BP > OR = 140: HCPCS | Performed by: FAMILY MEDICINE

## 2021-11-22 RX ORDER — CARVEDILOL PHOSPHATE 80 MG/1
80 CAPSULE, EXTENDED RELEASE ORAL
COMMUNITY
End: 2022-03-22 | Stop reason: ALTCHOICE

## 2021-11-22 RX ORDER — SPIRONOLACTONE 25 MG/1
25 TABLET ORAL DAILY
COMMUNITY
End: 2022-06-29 | Stop reason: SDUPTHER

## 2021-11-22 NOTE — PROGRESS NOTES
Chief Complaint   Patient presents with   [facility] Annual Wellness Visit     medicare wellness     1. Have you been to the ER, urgent care clinic since your last visit? Hospitalized since your last visit? yes [facility] Elevated BP    2. Have you seen or consulted any other health care providers outside of the [address] [place] [address] since your last visit? Include any pap smears or colon screening.  no

## 2021-11-22 NOTE — PROGRESS NOTES
HISTORY OF PRESENT ILLNESS  Dudley Sandoval is a 70 y.o. male. f/u hbp,chol,dm managed by endo. Sugar has been poorly controlled and the patient is reluctant to start Trulicity recommended by Endo. He also is reluctant to increase metformin,and wishes to avoid sulfonylureas. His blood pressure has been elevated ,he has CKD,and has started spironolactone 25 mg along with quinapril 40 mg and chlorthalidone 25 mg daily. He voices concern about MS because of lower leg numbness  Diabetes  The history is provided by the patient. This is a chronic problem. The problem occurs daily. The problem has been gradually improving. Hypertension   The history is provided by the patient. This is a chronic problem. The problem has been gradually improving. Pertinent negatives include no orthopnea, no palpitations, no malaise/fatigue, no blurred vision, no dizziness and no nausea. Cholesterol Problem  The history is provided by the patient. This is a chronic problem. The problem occurs daily. The problem has not changed since onset. Review of Systems   Constitutional: Negative for fever, malaise/fatigue and weight loss. HENT: Negative for congestion, hearing loss and sore throat. Eyes: Negative for blurred vision. Respiratory: Negative for cough, wheezing and stridor. Cardiovascular: Negative for palpitations and orthopnea. Gastrointestinal: Negative for diarrhea, heartburn and nausea. Genitourinary: Negative for frequency and urgency. Musculoskeletal: Positive for joint pain. Negative for back pain and myalgias. Skin: Positive for rash. Neurological: Positive for tingling and sensory change. Negative for dizziness and focal weakness. Psychiatric/Behavioral: The patient does not have insomnia. Physical Exam  Constitutional:       Appearance: Normal appearance. He is normal weight. HENT:      Head: Normocephalic and atraumatic.       Right Ear: Tympanic membrane normal.      Left Ear: Tympanic membrane normal.      Nose: Nose normal.      Mouth/Throat:      Mouth: Mucous membranes are moist.   Eyes:      Pupils: Pupils are equal, round, and reactive to light. Cardiovascular:      Rate and Rhythm: Normal rate and regular rhythm. Pulses: Normal pulses. Heart sounds: Normal heart sounds. Pulmonary:      Effort: Pulmonary effort is normal.      Breath sounds: Normal breath sounds. Abdominal:      General: Abdomen is flat. Palpations: Abdomen is soft. Genitourinary:     Prostate: Normal.      Rectum: Normal. Guaiac result negative. Musculoskeletal:      Cervical back: Normal range of motion and neck supple. Right lower leg: No edema. Left lower leg: No edema. Skin:     General: Skin is warm and dry. Comments: Comprehensive Diabetic Foot Exam  was performed     Neurological:      General: No focal deficit present. Mental Status: He is alert and oriented to person, place, and time. Diagnoses and all orders for this visit:    Diagnoses and all orders for this visit:    1. Essential hypertension, benign; to restart Carvedilol 80mg ER  -     METABOLIC PANEL, COMPREHENSIVE; Future    2. Type 2 diabetes mellitus with diabetic polyneuropathy, without long-term current use of insulin ([facility]    3. Type 2 diabetes mellitus with diabetic nephropathy, with long-term current use of insulin ([facility])  -     AMB POC HEMOGLOBIN A1C  -     MICROALBUMIN, UR, RAND W/ MICROALB/CREAT RATIO; Future    4. CKD (chronic kidney disease) stage 2, GFR 60-89 ml/min    5. Mixed hyperlipidemia  -     LIPID PANEL; Future    6. Diabetic polyneuropathy associated with type 2 diabetes mellitus ([facility]    7. S/P lumbar laminectomy    8. Multifactorial gait disorder, to arrange PT    Other orders  -     URINE CULTURE HOLD SAMPLE      Follow-up and Dispositions    · Return in about 4 weeks (around 12/20/2021). Follow-up and Dispositions    · Return in about 4 weeks (around 12/20/2021).

## 2021-12-22 ENCOUNTER — OFFICE VISIT (OUTPATIENT)
Dept: FAMILY MEDICINE CLINIC | Age: 71
End: 2021-12-22
Payer: MEDICARE

## 2021-12-22 VITALS
HEIGHT: 74 IN | SYSTOLIC BLOOD PRESSURE: 136 MMHG | WEIGHT: 221.2 LBS | OXYGEN SATURATION: 96 % | DIASTOLIC BLOOD PRESSURE: 92 MMHG | BODY MASS INDEX: 28.39 KG/M2 | TEMPERATURE: 98.3 F | HEART RATE: 73 BPM | RESPIRATION RATE: 18 BRPM

## 2021-12-22 DIAGNOSIS — N18.2 CKD (CHRONIC KIDNEY DISEASE) STAGE 2, GFR 60-89 ML/MIN: ICD-10-CM

## 2021-12-22 DIAGNOSIS — E11.42 TYPE 2 DIABETES MELLITUS WITH DIABETIC POLYNEUROPATHY, WITHOUT LONG-TERM CURRENT USE OF INSULIN (HCC): ICD-10-CM

## 2021-12-22 DIAGNOSIS — I10 ESSENTIAL HYPERTENSION, BENIGN: Primary | ICD-10-CM

## 2021-12-22 DIAGNOSIS — E11.42 DIABETIC POLYNEUROPATHY ASSOCIATED WITH TYPE 2 DIABETES MELLITUS (HCC): ICD-10-CM

## 2021-12-22 PROCEDURE — G8752 SYS BP LESS 140: HCPCS | Performed by: FAMILY MEDICINE

## 2021-12-22 PROCEDURE — 3052F HG A1C>EQUAL 8.0%<EQUAL 9.0%: CPT | Performed by: FAMILY MEDICINE

## 2021-12-22 PROCEDURE — 2022F DILAT RTA XM EVC RTNOPTHY: CPT | Performed by: FAMILY MEDICINE

## 2021-12-22 PROCEDURE — G0463 HOSPITAL OUTPT CLINIC VISIT: HCPCS | Performed by: FAMILY MEDICINE

## 2021-12-22 PROCEDURE — G8419 CALC BMI OUT NRM PARAM NOF/U: HCPCS | Performed by: FAMILY MEDICINE

## 2021-12-22 PROCEDURE — G8510 SCR DEP NEG, NO PLAN REQD: HCPCS | Performed by: FAMILY MEDICINE

## 2021-12-22 PROCEDURE — G8427 DOCREV CUR MEDS BY ELIG CLIN: HCPCS | Performed by: FAMILY MEDICINE

## 2021-12-22 PROCEDURE — 1101F PT FALLS ASSESS-DOCD LE1/YR: CPT | Performed by: FAMILY MEDICINE

## 2021-12-22 PROCEDURE — 99213 OFFICE O/P EST LOW 20 MIN: CPT | Performed by: FAMILY MEDICINE

## 2021-12-22 PROCEDURE — 3017F COLORECTAL CA SCREEN DOC REV: CPT | Performed by: FAMILY MEDICINE

## 2021-12-22 PROCEDURE — G8536 NO DOC ELDER MAL SCRN: HCPCS | Performed by: FAMILY MEDICINE

## 2021-12-22 PROCEDURE — G8755 DIAS BP > OR = 90: HCPCS | Performed by: FAMILY MEDICINE

## 2021-12-22 RX ORDER — DILTIAZEM HYDROCHLORIDE 120 MG/1
120 CAPSULE, COATED, EXTENDED RELEASE ORAL DAILY
Qty: 30 CAPSULE | Refills: 11 | Status: SHIPPED | OUTPATIENT
Start: 2021-12-22 | End: 2022-06-29 | Stop reason: SDUPTHER

## 2021-12-22 NOTE — PROGRESS NOTES
HISTORY OF PRESENT ILLNESS  Sherry Vasques is a 70 y.o. male. f/u hbp,chol,dm managed by nick Hernández He also is reluctant to increase metformin,and wishes to avoid sulfonylureas. His blood pressure has been elevated ,he has mild,stable CKD,and has started spironolactone 25 mg along with quinapril 40 mg and Carvedolol. Diabetes  The history is provided by the patient. This is a chronic problem. The problem occurs daily. The problem has been gradually improving. Hypertension   The history is provided by the patient. This is a chronic problem. The problem has not changed since onset. Pertinent negatives include no orthopnea, no palpitations, no malaise/fatigue, no blurred vision, no dizziness and no nausea. Neurologic Problem  The history is provided by the patient. This is a chronic problem. There was left lower extremity and right lower extremity focality noted. Primary symptoms include loss of sensation. Pertinent negatives include no focal weakness. Pertinent negatives include no nausea. Review of Systems   Constitutional: Negative for fever, malaise/fatigue and weight loss. HENT: Negative for congestion, hearing loss and sore throat. Eyes: Negative for blurred vision. Respiratory: Negative for cough, wheezing and stridor. Cardiovascular: Negative for palpitations and orthopnea. Gastrointestinal: Negative for diarrhea, heartburn and nausea. Genitourinary: Negative for frequency and urgency. Musculoskeletal: Positive for joint pain. Negative for back pain and myalgias. Skin: Negative for rash. Neurological: Positive for tingling and sensory change. Negative for dizziness and focal weakness. Psychiatric/Behavioral: The patient does not have insomnia. Physical Exam  Constitutional:       Appearance: Normal appearance. He is normal weight. HENT:      Head: Normocephalic and atraumatic.       Right Ear: Tympanic membrane normal.      Left Ear: Tympanic membrane normal.      Nose: Nose normal. Mouth/Throat:      Mouth: Mucous membranes are moist.   Eyes:      Pupils: Pupils are equal, round, and reactive to light. Cardiovascular:      Rate and Rhythm: Normal rate and regular rhythm. Pulses: Normal pulses. Heart sounds: Normal heart sounds. Pulmonary:      Effort: Pulmonary effort is normal.      Breath sounds: Normal breath sounds. Abdominal:      General: Abdomen is flat. Palpations: Abdomen is soft. Genitourinary:     Prostate: Normal.      Rectum: Normal. Guaiac result negative. Musculoskeletal:      Cervical back: Normal range of motion and neck supple. Right lower leg: No edema. Left lower leg: No edema. Skin:     General: Skin is warm and dry. Comments: Comprehensive Diabetic Foot Exam  was performed     Neurological:      General: No focal deficit present. Mental Status: He is alert and oriented to person, place, and time. Diagnoses and all orders for this visit:    1. Essential hypertension, benign,inadequate control,will add diltiazem    2. Type 2 diabetes mellitus with diabetic polyneuropathy, without long-term current use of insulin ([facility]    3. CKD (chronic kidney disease) stage 2, GFR 60-89 ml/min,stable    4. Diabetic polyneuropathy associated with type 2 diabetes mellitus (HCC),stable;to start nortriptyline    Other orders  -     dilTIAZem ER (CARDIZEM CD) 120 mg capsule; Take 1 Capsule by mouth daily. Follow-up and Dispositions    · Return in about 2 months (around 2/22/2022). Follow-up and Dispositions    · Return in about 2 months (around 2/22/2022).

## 2021-12-22 NOTE — PROGRESS NOTES
Chief Complaint   Patient presents with    Diabetes     F/U on diabetes.  Hypertension     F/U on BP.  Cholesterol Problem     F/U on cholesterol. 1. Have you been to the ER, urgent care clinic since your last visit? Hospitalized since your last visit? No    2. Have you seen or consulted any other health care providers outside of the [address] [place] [address] since your last visit? Include any pap smears or colon screening.  No

## 2022-03-08 DIAGNOSIS — I10 ESSENTIAL HYPERTENSION, BENIGN: ICD-10-CM

## 2022-03-08 RX ORDER — QUINAPRIL 40 MG/1
TABLET ORAL
Qty: 180 TABLET | Refills: 2 | Status: SHIPPED | OUTPATIENT
Start: 2022-03-08 | End: 2022-06-29 | Stop reason: SDUPTHER

## 2022-03-22 ENCOUNTER — OFFICE VISIT (OUTPATIENT)
Dept: FAMILY MEDICINE CLINIC | Age: 72
End: 2022-03-22
Payer: MEDICARE

## 2022-03-22 VITALS
WEIGHT: 221.6 LBS | HEART RATE: 72 BPM | BODY MASS INDEX: 28.44 KG/M2 | TEMPERATURE: 98.4 F | SYSTOLIC BLOOD PRESSURE: 140 MMHG | RESPIRATION RATE: 18 BRPM | HEIGHT: 74 IN | OXYGEN SATURATION: 98 % | DIASTOLIC BLOOD PRESSURE: 94 MMHG

## 2022-03-22 DIAGNOSIS — I10 ESSENTIAL HYPERTENSION, BENIGN: ICD-10-CM

## 2022-03-22 DIAGNOSIS — E11.42 DIABETIC POLYNEUROPATHY ASSOCIATED WITH TYPE 2 DIABETES MELLITUS (HCC): ICD-10-CM

## 2022-03-22 DIAGNOSIS — E11.42 TYPE 2 DIABETES MELLITUS WITH DIABETIC POLYNEUROPATHY, WITHOUT LONG-TERM CURRENT USE OF INSULIN (HCC): ICD-10-CM

## 2022-03-22 DIAGNOSIS — N18.2 CKD (CHRONIC KIDNEY DISEASE) STAGE 2, GFR 60-89 ML/MIN: ICD-10-CM

## 2022-03-22 DIAGNOSIS — Z00.00 MEDICARE ANNUAL WELLNESS VISIT, SUBSEQUENT: Primary | ICD-10-CM

## 2022-03-22 DIAGNOSIS — E78.2 MIXED HYPERLIPIDEMIA: ICD-10-CM

## 2022-03-22 LAB — HBA1C MFR BLD HPLC: 9.2 %

## 2022-03-22 PROCEDURE — 83036 HEMOGLOBIN GLYCOSYLATED A1C: CPT | Performed by: FAMILY MEDICINE

## 2022-03-22 PROCEDURE — G8536 NO DOC ELDER MAL SCRN: HCPCS | Performed by: FAMILY MEDICINE

## 2022-03-22 PROCEDURE — 99214 OFFICE O/P EST MOD 30 MIN: CPT | Performed by: FAMILY MEDICINE

## 2022-03-22 PROCEDURE — G8419 CALC BMI OUT NRM PARAM NOF/U: HCPCS | Performed by: FAMILY MEDICINE

## 2022-03-22 PROCEDURE — G8755 DIAS BP > OR = 90: HCPCS | Performed by: FAMILY MEDICINE

## 2022-03-22 PROCEDURE — G8753 SYS BP > OR = 140: HCPCS | Performed by: FAMILY MEDICINE

## 2022-03-22 PROCEDURE — 2022F DILAT RTA XM EVC RTNOPTHY: CPT | Performed by: FAMILY MEDICINE

## 2022-03-22 PROCEDURE — 1101F PT FALLS ASSESS-DOCD LE1/YR: CPT | Performed by: FAMILY MEDICINE

## 2022-03-22 PROCEDURE — 3046F HEMOGLOBIN A1C LEVEL >9.0%: CPT | Performed by: FAMILY MEDICINE

## 2022-03-22 PROCEDURE — G8432 DEP SCR NOT DOC, RNG: HCPCS | Performed by: FAMILY MEDICINE

## 2022-03-22 PROCEDURE — 3017F COLORECTAL CA SCREEN DOC REV: CPT | Performed by: FAMILY MEDICINE

## 2022-03-22 PROCEDURE — G8428 CUR MEDS NOT DOCUMENT: HCPCS | Performed by: FAMILY MEDICINE

## 2022-03-22 PROCEDURE — G0463 HOSPITAL OUTPT CLINIC VISIT: HCPCS | Performed by: FAMILY MEDICINE

## 2022-03-22 RX ORDER — CARVEDILOL PHOSPHATE 80 MG/1
80 CAPSULE, EXTENDED RELEASE ORAL
Qty: 30 CAPSULE | Refills: 0
Start: 2022-03-22 | End: 2022-06-29 | Stop reason: SDUPTHER

## 2022-03-22 NOTE — PROGRESS NOTES
Chief Complaint   Patient presents with    Annual Wellness Visit     Pt getting annual medicare wellness exam.       1. \"Have you been to the ER, urgent care clinic since your last visit? Hospitalized since your last visit? \" No    2. \"Have you seen or consulted any other health care providers outside of the [address] [place] [address] since your last visit? \" No     3. For patients aged 39-70: Has the patient had a colonoscopy / FIT/ Cologuard? No      If the patient is female:    4. For patients aged 41-77: Has the patient had a mammogram within the past 2 years? NA - based on age or sex      11. For patients aged 21-65: Has the patient had a pap smear?  NA - based on age or sex    Health Maintenance Due   Topic Date Due    COVID-19 Vaccine (1) Never done    Colorectal Cancer Screening Combo  Never done    Shingrix Vaccine Age 50> (1 of 2) Never done    Flu Vaccine (1) 09/01/2021    Medicare Yearly Exam  11/10/2021

## 2022-03-22 NOTE — PROGRESS NOTES
HISTORY OF PRESENT ILLNESS  Kelsy Montejo is a 70 y.o. male. f/u hbp,chol,dm no longer managed by endo. Sugar has been better controlled . He also is reluctant to increase metformin,and wishes to avoid sulfonylureas. His blood pressure has been better controlled ,he has CKD,and has started spironolactone 25 mg along with quinapril 40 mg . Diabetes  The history is provided by the patient. This is a chronic problem. The problem occurs daily. The problem has been gradually improving. Hypertension   The history is provided by the patient. This is a chronic problem. The problem has been gradually improving. Pertinent negatives include no orthopnea, no palpitations, no malaise/fatigue, no blurred vision, no dizziness and no nausea. Cholesterol Problem  The history is provided by the patient. This is a chronic problem. The problem occurs daily. The problem has not changed since onset. Review of Systems   Constitutional: Negative for fever, malaise/fatigue and weight loss. HENT: Negative for congestion, hearing loss and sore throat. Eyes: Negative for blurred vision. Respiratory: Negative for cough, wheezing and stridor. Cardiovascular: Negative for palpitations and orthopnea. Gastrointestinal: Negative for diarrhea, heartburn and nausea. Genitourinary: Negative for frequency and urgency. Musculoskeletal: Positive for joint pain and myalgias. Negative for back pain. Skin: Negative for rash. Neurological: Positive for tingling and sensory change. Negative for dizziness and focal weakness. Psychiatric/Behavioral: The patient does not have insomnia. Physical Exam  Constitutional:       Appearance: Normal appearance. He is normal weight. HENT:      Head: Normocephalic and atraumatic.       Right Ear: Tympanic membrane normal.      Left Ear: Tympanic membrane normal.      Nose: Nose normal.      Mouth/Throat:      Mouth: Mucous membranes are moist.   Eyes:      Pupils: Pupils are equal, round, and reactive to light. Cardiovascular:      Rate and Rhythm: Normal rate and regular rhythm. Pulses: Normal pulses. Heart sounds: Normal heart sounds. Pulmonary:      Effort: Pulmonary effort is normal.      Breath sounds: Normal breath sounds. Abdominal:      General: Abdomen is flat. Palpations: Abdomen is soft. Musculoskeletal:      Cervical back: Normal range of motion and neck supple. Right lower leg: No edema. Left lower leg: No edema. Skin:     General: Skin is warm and dry. Comments: Comprehensive Diabetic Foot Exam  was performed     Neurological:      General: No focal deficit present. Mental Status: He is alert and oriented to person, place, and time. Mental status is at baseline. Psychiatric:         Mood and Affect: Mood normal.         Diagnoses and all orders for this visit:    1. Medicare annual wellness visit, subsequent    2. Type 2 diabetes mellitus with diabetic polyneuropathy, without long-term current use of insulin ([facility])  -     AMB POC HEMOGLOBIN A1C    3. Essential hypertension, benign  -     METABOLIC PANEL, COMPREHENSIVE; Future    4. CKD (chronic kidney disease) stage 2, GFR 60-89 ml/min    5. Diabetic polyneuropathy associated with type 2 diabetes mellitus ([facility] [address]    6. Mixed hyperlipidemia  -     CHOLESTEROL, TOTAL; Future    Other orders  -     carvedilol (COREG CR) 80 mg CR capsule; Take 1 Capsule by mouth daily (with breakfast). Follow-up and Dispositions    · Return in about 3 months (around 6/22/2022). Follow-up and Dispositions    · Return in about 3 months (around 6/22/2022).

## 2022-03-22 NOTE — PROGRESS NOTES
This is the Subsequent Medicare Annual Wellness Exam, performed 12 months or more after the Initial AWV or the last Subsequent AWV    I have reviewed the patient's medical history in detail and updated the computerized patient record. Assessment/Plan   Education and counseling provided:  Are appropriate based on today's review and evaluation    1. Medicare annual wellness visit, subsequent  2. Type 2 diabetes mellitus with diabetic polyneuropathy, without long-term current use of insulin (HCC)  -     AMB POC HEMOGLOBIN A1C  3. Essential hypertension, benign  -     METABOLIC PANEL, COMPREHENSIVE; Future  4. CKD (chronic kidney disease) stage 2, GFR 60-89 ml/min  5. Diabetic polyneuropathy associated with type 2 diabetes mellitus ([facility] [address]  6. Mixed hyperlipidemia  -     CHOLESTEROL, TOTAL; Future       Depression Risk Factor Screening     3 most recent PHQ Screens 3/22/2022   Little interest or pleasure in doing things Not at all   Feeling down, depressed, irritable, or hopeless Not at all   Total Score PHQ 2 0       Alcohol & Drug Abuse Risk Screen    Do you average more than 1 drink per night or more than 7 drinks a week: No    In the past three months have you have had more than 4 drinks containing alcohol on one occasion: No          Functional Ability and Level of Safety    Hearing: Hearing is good. Activities of Daily Living: The home contains: handrails  Patient does total self care      Ambulation: with no difficulty     Fall Risk:  Fall Risk Assessment, last 12 mths 3/22/2022   Able to walk? Yes   Fall in past 12 months? 0   Do you feel unsteady?  0   Are you worried about falling 0      Abuse Screen:  Patient is not abused       Cognitive Screening    Has your family/caregiver stated any concerns about your memory: no     Cognitive Screenin    Health Maintenance Due     Health Maintenance Due   Topic Date Due    COVID-19 Vaccine (1) Never done    Colorectal Cancer Screening Combo  Never done  Shingrix Vaccine Age 50> (1 of 2) Never done    Flu Vaccine (1) 09/01/2021    Medicare Yearly Exam  11/10/2021       Patient Care Team   Patient Care Team:  Herbert Box MD as PCP - [facility]  Herbert Box MD as PCP - [facility] Provider  Luis Daniel Romero MD (Ophthalmology)    History     Patient Active Problem List   Diagnosis Code    Essential hypertension, benign I10    Mixed hyperlipidemia E78.2    Encounter for long-term (current) use of other medications Z79.899    Nocturia R35.1    ED (erectile dysfunction) N52.9    Allergic rhinitis, cause unspecified J30.9    Elevated PSA R97.20    OAB (overactive bladder) N32.81    Type 2 diabetes mellitus with diabetic nephropathy, with long-term current use of insulin (HCC) E11.21, Z79.4    CKD (chronic kidney disease) stage 2, GFR 60-89 ml/min N18.2     Past Medical History:   Diagnosis Date    Allergic rhinitis, cause unspecified 2/12/2013    CKD (chronic kidney disease) stage 2, GFR 60-89 ml/min 11/3/2016    Dysuria     ED (erectile dysfunction) 12/2/2011    Elevated PSA     benign PNBx 2012, 1/6/2016    Encounter for long-term (current) use of other medications 1/9/2011    Essential hypertension, benign 1/9/2011    Gross hematuria     Hemorrhagic cystitis     Mixed hyperlipidemia 1/9/2011    Nocturia 10/10/2011    OAB (overactive bladder)     Orchitis     Type II or unspecified type diabetes mellitus without mention of complication, not stated as uncontrolled 1/9/2011    UTI (urinary tract infection)       Past Surgical History:   Procedure Laterality Date    HX HEMORRHOIDECTOMY      HX HERNIA REPAIR      HX ORTHOPAEDIC      HX UROLOGICAL  05/15/12    [facility], PNBx - TRUS Vol 23.22 cc's, Benign, Dr Holden Caldwell HX UROLOGICAL  1/6/16    PNBx-TRUS VOL 20.06cc's, Benign, pre-Bx PSA 10.07, Dr. Lisa Regalado     Current Outpatient Medications   Medication Sig Dispense Refill    quinapriL (ACCUPRIL) 40 mg tablet TAKE 2 TABLETS BY MOUTH EVERY DAY (Patient taking differently: Take 40 mg by mouth daily. ) 180 Tablet 2    dilTIAZem ER (CARDIZEM CD) 120 mg capsule Take 1 Capsule by mouth daily. 30 Capsule 11    spironolactone (ALDACTONE) 25 mg tablet Take 25 mg by mouth daily.  metFORMIN (GLUCOPHAGE) 500 mg tablet Take  by mouth three (3) times daily (with meals).  ONETOUCH ULTRA TEST strip TEST FOUR TIMES DAILY AS DIRECTED 300 Strip 0    glucose blood VI test strips (ONETOUCH ULTRA TEST) strip by Does Not Apply route See Admin Instructions. Dx. Code E11.9 300 Strip 3    triamcinolone acetonide (KENALOG) 0.1 % topical cream Apply  to affected area two (2) times a day.  use thin layer 85 g 1    Blood-Glucose Meter monitoring kit Test 4-5 times per day as needed 1 Kit 0    glucose blood VI test strips (BLOOD GLUCOSE TEST) strip Test 4-5 times per day as needed 150 Strip 11     No Known Allergies    Family History   Problem Relation Age of Onset    Diabetes Mother     Cancer Father      Social History     Tobacco Use    Smoking status: Former Smoker    Smokeless tobacco: Never Used   Substance Use Topics    Alcohol use: No         Preeti Choudhary MD

## 2022-03-23 ENCOUNTER — TELEPHONE (OUTPATIENT)
Dept: FAMILY MEDICINE CLINIC | Age: 72
End: 2022-03-23

## 2022-03-23 LAB
ALBUMIN SERPL-MCNC: 4.1 G/DL (ref 3.5–5)
ALBUMIN/GLOB SERPL: 1 {RATIO} (ref 1.1–2.2)
ALP SERPL-CCNC: 77 U/L (ref 45–117)
ALT SERPL-CCNC: 24 U/L (ref 12–78)
ANION GAP SERPL CALC-SCNC: 4 MMOL/L (ref 5–15)
AST SERPL-CCNC: 12 U/L (ref 15–37)
BILIRUB SERPL-MCNC: 0.4 MG/DL (ref 0.2–1)
BUN SERPL-MCNC: 20 MG/DL (ref 6–20)
BUN/CREAT SERPL: 15 (ref 12–20)
CALCIUM SERPL-MCNC: 8.9 MG/DL (ref 8.5–10.1)
CHLORIDE SERPL-SCNC: 98 MMOL/L (ref 97–108)
CHOLEST SERPL-MCNC: 181 MG/DL
CO2 SERPL-SCNC: 28 MMOL/L (ref 21–32)
CREAT SERPL-MCNC: 1.33 MG/DL (ref 0.7–1.3)
GLOBULIN SER CALC-MCNC: 4.2 G/DL (ref 2–4)
GLUCOSE SERPL-MCNC: 236 MG/DL (ref 65–100)
POTASSIUM SERPL-SCNC: 4.8 MMOL/L (ref 3.5–5.1)
PROT SERPL-MCNC: 8.3 G/DL (ref 6.4–8.2)
SODIUM SERPL-SCNC: 130 MMOL/L (ref 136–145)

## 2022-03-23 RX ORDER — GLIPIZIDE 5 MG/1
10 TABLET ORAL 2 TIMES DAILY
Qty: 60 TABLET | Refills: 5
Start: 2022-03-23 | End: 2022-06-29 | Stop reason: ALTCHOICE

## 2022-03-23 NOTE — TELEPHONE ENCOUNTER
Elizabeth Petit    Male, 70 y.o., 1950  Weight:   221 lb 9.6 oz (100.5 kg)  Phone:   [phone] Errolswapna Gardiner)  PCP:   Jena Kunz MD          MRN:   [id number]  MyChart:   Pending  Next Appt:   None                  Message  Received: [facility] Nurse Pool  Subject: Refill Request     QUESTIONS   Name of Medication? Other - Glipizide 5mg   Patient-reported dosage and instructions? 1 pill daily   How many days do you have left? 0   [id number]   Pharmacy phone number (if available)? [phone]   Additional Information for Provider? The medication listed above has been   prescribed by the patient endocrinologist and the medication does NOT need   to be filled at this time. Dr. Vanesa Garcia requested that the patient call into   the office with the medication name & information. Please call the patient   to confirm which medications Dr. Vanesa Garcia would like for him to take?   ---------------------------------------------------------------------------   --------------   CALL BACK INFO   What is the best way for the office to contact you? OK to leave message on   voicemail   Preferred Call Back Phone Number?  [phone]

## 2022-06-14 PROBLEM — N18.30 CHRONIC RENAL DISEASE, STAGE III (HCC): Status: ACTIVE | Noted: 2022-06-14

## 2022-06-29 ENCOUNTER — OFFICE VISIT (OUTPATIENT)
Dept: FAMILY MEDICINE CLINIC | Age: 72
End: 2022-06-29
Payer: MEDICARE

## 2022-06-29 VITALS
SYSTOLIC BLOOD PRESSURE: 134 MMHG | RESPIRATION RATE: 18 BRPM | BODY MASS INDEX: 27.82 KG/M2 | HEART RATE: 85 BPM | TEMPERATURE: 98.4 F | WEIGHT: 216.8 LBS | HEIGHT: 74 IN | DIASTOLIC BLOOD PRESSURE: 86 MMHG | OXYGEN SATURATION: 97 %

## 2022-06-29 DIAGNOSIS — E11.42 TYPE 2 DIABETES MELLITUS WITH DIABETIC POLYNEUROPATHY, WITHOUT LONG-TERM CURRENT USE OF INSULIN (HCC): Primary | ICD-10-CM

## 2022-06-29 DIAGNOSIS — E11.42 DIABETIC POLYNEUROPATHY ASSOCIATED WITH TYPE 2 DIABETES MELLITUS (HCC): ICD-10-CM

## 2022-06-29 DIAGNOSIS — E78.2 MIXED HYPERLIPIDEMIA: ICD-10-CM

## 2022-06-29 DIAGNOSIS — I10 ESSENTIAL HYPERTENSION, BENIGN: ICD-10-CM

## 2022-06-29 DIAGNOSIS — N18.2 CKD (CHRONIC KIDNEY DISEASE) STAGE 2, GFR 60-89 ML/MIN: ICD-10-CM

## 2022-06-29 PROCEDURE — G8754 DIAS BP LESS 90: HCPCS | Performed by: FAMILY MEDICINE

## 2022-06-29 PROCEDURE — G8510 SCR DEP NEG, NO PLAN REQD: HCPCS | Performed by: FAMILY MEDICINE

## 2022-06-29 PROCEDURE — G8752 SYS BP LESS 140: HCPCS | Performed by: FAMILY MEDICINE

## 2022-06-29 PROCEDURE — G8417 CALC BMI ABV UP PARAM F/U: HCPCS | Performed by: FAMILY MEDICINE

## 2022-06-29 PROCEDURE — 2022F DILAT RTA XM EVC RTNOPTHY: CPT | Performed by: FAMILY MEDICINE

## 2022-06-29 PROCEDURE — 99214 OFFICE O/P EST MOD 30 MIN: CPT | Performed by: FAMILY MEDICINE

## 2022-06-29 PROCEDURE — 3017F COLORECTAL CA SCREEN DOC REV: CPT | Performed by: FAMILY MEDICINE

## 2022-06-29 PROCEDURE — 1101F PT FALLS ASSESS-DOCD LE1/YR: CPT | Performed by: FAMILY MEDICINE

## 2022-06-29 PROCEDURE — G8536 NO DOC ELDER MAL SCRN: HCPCS | Performed by: FAMILY MEDICINE

## 2022-06-29 PROCEDURE — 3046F HEMOGLOBIN A1C LEVEL >9.0%: CPT | Performed by: FAMILY MEDICINE

## 2022-06-29 PROCEDURE — G8427 DOCREV CUR MEDS BY ELIG CLIN: HCPCS | Performed by: FAMILY MEDICINE

## 2022-06-29 PROCEDURE — G0463 HOSPITAL OUTPT CLINIC VISIT: HCPCS | Performed by: FAMILY MEDICINE

## 2022-06-29 PROCEDURE — 1123F ACP DISCUSS/DSCN MKR DOCD: CPT | Performed by: FAMILY MEDICINE

## 2022-06-29 RX ORDER — SPIRONOLACTONE 25 MG/1
25 TABLET ORAL DAILY
Qty: 90 TABLET | Refills: 3 | Status: SHIPPED | OUTPATIENT
Start: 2022-06-29

## 2022-06-29 RX ORDER — DILTIAZEM HYDROCHLORIDE 120 MG/1
120 CAPSULE, COATED, EXTENDED RELEASE ORAL DAILY
Qty: 90 CAPSULE | Refills: 3 | Status: SHIPPED | OUTPATIENT
Start: 2022-06-29

## 2022-06-29 RX ORDER — CARVEDILOL PHOSPHATE 80 MG/1
80 CAPSULE, EXTENDED RELEASE ORAL
Qty: 90 CAPSULE | Refills: 3 | Status: SHIPPED | OUTPATIENT
Start: 2022-06-29

## 2022-06-29 RX ORDER — QUINAPRIL 40 MG/1
40 TABLET ORAL DAILY
Qty: 90 TABLET | Refills: 3 | Status: SHIPPED | OUTPATIENT
Start: 2022-06-29

## 2022-06-29 NOTE — PROGRESS NOTES
HISTORY OF PRESENT ILLNESS  Lucita Aragon is a 67 y.o. male. seen in ER this am,advised to take metformin tid. A1c was 8. 2. Feeling well,polyneuropathy is biggest c/o. F/U HBP,DM2 CKD  Diabetes  The history is provided by the patient. This is a chronic problem. The problem occurs daily. The problem has not changed since onset. Pertinent negatives include no chest pain, no headaches and no shortness of breath. Hypertension   The history is provided by the patient. This is a chronic problem. The problem has not changed since onset. Pertinent negatives include no chest pain, no orthopnea, no palpitations, no headaches, no peripheral edema, no dizziness and no shortness of breath. Neurologic Problem  The history is provided by the patient. This is a chronic problem. There was left lower extremity and right lower extremity focality noted. Primary symptoms include loss of sensation and loss of balance. Pertinent negatives include no mental status change. Pertinent negatives include no shortness of breath, no chest pain and no headaches. Review of Systems   Constitutional: Negative for fever. Respiratory: Negative for shortness of breath. Cardiovascular: Negative for chest pain, palpitations and orthopnea. Gastrointestinal: Negative for constipation. Genitourinary: Negative for frequency. Musculoskeletal: Negative for joint pain. Skin: Negative for rash. Neurological: Positive for tingling, sensory change and loss of balance. Negative for dizziness and headaches. Physical Exam  Constitutional:       Appearance: Normal appearance. He is normal weight. HENT:      Head: Normocephalic. Right Ear: Tympanic membrane normal.      Left Ear: Tympanic membrane normal.   Cardiovascular:      Rate and Rhythm: Normal rate and regular rhythm. Pulses: Normal pulses. Heart sounds: Normal heart sounds. Pulmonary:      Effort: Pulmonary effort is normal.      Breath sounds: Normal breath sounds. Abdominal:      General: Abdomen is flat. Palpations: Abdomen is soft. Musculoskeletal:      Right lower leg: No edema. Left lower leg: No edema. Skin:     General: Skin is warm and dry. Neurological:      General: No focal deficit present. Mental Status: He is alert and oriented to person, place, and time. ASSESSMENT and PLAN  Diagnoses and all orders for this visit:    1. Type 2 diabetes mellitus with diabetic polyneuropathy, without long-term current use of insulin (HCC),fair control    2. Essential hypertension, benign,did not take meds this am,bp likely ok  -     spironolactone (ALDACTONE) 25 mg tablet; Take 1 Tablet by mouth daily. -     dilTIAZem ER (CARDIZEM CD) 120 mg capsule; Take 1 Capsule by mouth daily. -     carvedilol (COREG CR) 80 mg CR capsule; Take 1 Capsule by mouth daily (with breakfast). -     quinapriL (ACCUPRIL) 40 mg tablet; Take 1 Tablet by mouth daily. 3. CKD (chronic kidney disease) stage 2, GFR 60-89 ml/min    4. Diabetic polyneuropathy associated with type 2 diabetes mellitus (HCC),troublesome    5. Mixed hyperlipidemia    Continue current meds and treatments,and call if any questions. Follow-up and Dispositions    · Return in about 2 months (around 8/29/2022).

## 2022-06-29 NOTE — PROGRESS NOTES
Chief Complaint   Patient presents with    Diabetes     F/U on diabetes.  Complete Physical     F/U on BP.  Cholesterol Problem     F/U on cholesterol. 1. \"Have you been to the ER, urgent care clinic since your last visit? Hospitalized since your last visit? \" No    2. \"Have you seen or consulted any other health care providers outside of the [address] [place] [address] since your last visit? \" Yes When: Shirley on 6/29/22     3. For patients aged 39-70: Has the patient had a colonoscopy / FIT/ Cologuard? No      If the patient is female:    4. For patients aged 41-77: Has the patient had a mammogram within the past 2 years? NA - based on age or sex      11. For patients aged 21-65: Has the patient had a pap smear?  NA - based on age or sex    Health Maintenance Due   Topic Date Due    COVID-19 Vaccine (1) Never done    Colorectal Cancer Screening Combo  Never done    Shingrix Vaccine Age 50> (1 of 2) Never done    Medicare Yearly Exam  11/10/2021    A1C test (Diabetic or Prediabetic)  06/22/2022    Eye Exam Retinal or Dilated  06/22/2022    Foot Exam Q1  07/23/2022

## 2022-07-07 LAB — HBA1C MFR BLD HPLC: 10.5 %

## 2022-08-25 NOTE — TELEPHONE ENCOUNTER
Patient called and left message asking for his glipizide 5mg to be refilled. Last visit:6/29/22  Next visit:not scheduled  Previous refill 3/23/22(60+5R)      Requested Prescriptions     Pending Prescriptions Disp Refills    glipiZIDE (GLUCOTROL) 5 mg tablet 60 Tablet 5     Sig: Take 2 Tablets by mouth two (2) times a day. For [facility] in place:   Recommendation Provided To:    Intervention Detail: New Rx: 1, reason: Patient Preference  Gap Closed?:   Intervention Accepted By:   Time Spent (min): 5

## 2022-08-27 RX ORDER — GLIPIZIDE 5 MG/1
10 TABLET ORAL 2 TIMES DAILY
Qty: 60 TABLET | Refills: 5 | Status: SHIPPED | OUTPATIENT
Start: 2022-08-27

## 2023-03-06 LAB — HBA1C MFR BLD HPLC: 10 %

## 2024-06-12 NOTE — MR AVS SNAPSHOT
[place] [id number] [place] [id number] [phone] Patient: Arnol Yip MRN: [id number] CCR:[birth year] Visit Information Date & Time Provider Department Dept. Phone Encounter #  
 5/17/2018 11:45 AM Aristides Cole [phone] [id number] Follow-up Instructions Return in about 3 months (around 8/17/2018). Upcoming Health Maintenance Date Due FOBT Q 1 YEAR AGE 50-75 4/17/2000 EYE EXAM RETINAL OR DILATED Q1 7/22/2016 GLAUCOMA SCREENING Q2Y 7/22/2017 MICROALBUMIN Q1 3/3/2018 MEDICARE YEARLY EXAM 6/13/2018 HEMOGLOBIN A1C Q6M 7/2/2018 Influenza Age 5 to Adult 8/1/2018 LIPID PANEL Q1 1/2/2019 FOOT EXAM Q1 1/12/2019 DTaP/Tdap/Td series (2 - Td) 7/5/2026 Allergies as of 5/17/2018  Review Complete On: 4/11/2018 By: Mimi Gordon RN No Known Allergies Current Immunizations  Reviewed on 11/3/2016 Name Date Influenza High Dose Vaccine PF 11/3/2016 Influenza Vaccine Split 10/11/2012 Pneumococcal Conjugate (PCV-13) 7/30/2015 ZZZ-RETIRED (DO NOT USE) Pneumococcal Vaccine (Unspecified Type) 10/11/2012 Not reviewed this visit You Were Diagnosed With   
  
 Codes Comments Generalized abdominal discomfort    -  Primary ICD-10-CM: R10.84 ICD-9-CM: 789.07 Type 2 diabetes mellitus with diabetic nephropathy, with long-term current use of insulin (HCC)     ICD-10-CM: E11.21, Z79.4 ICD-9-CM: 250.40, 583.81, V58.67 Essential hypertension, benign     ICD-10-CM: I10 
ICD-9-CM: 401.1 CKD (chronic kidney disease) stage 2, GFR 60-89 ml/min     ICD-10-CM: N18.2 ICD-9-CM: 878. 2 Vitals BP Pulse Temp Resp Height(growth percentile) Weight(growth percentile) 108/70 (BP 1 Location: Right arm, BP Patient Position: Sitting) 63 98.1 °F (36.7 °C) (Oral) 22 6' 2.5\" (1.892 m) 229 lb 12.8 oz (104.2 kg) SpO2 BMI Smoking Status spouse returning call in regards to below, connected to triage.   96% 29.11 kg/m2 Former Smoker Vitals History BMI and BSA Data Body Mass Index Body Surface Area  
 29.11 kg/m 2 2.34 m 2 Preferred Pharmacy Pharmacy Name Phone [facility] [address], [address] [place] [address] [phone] Your Updated Medication List  
  
   
This list is accurate as of 5/17/18 12:02 PM.  Always use your most recent med list.  
  
  
  
  
 Blood-Glucose Meter monitoring kit Test 4-5 times per day as needed  
  
 carvedilol 12.5 mg tablet Commonly known as:  COREG  
TK 1 T PO  BID  
  
 chlorthalidone 25 mg tablet Commonly known as:  HYGROTEN  
TK 1 T PO D  
  
 COMBIGAN 0.2-0.5 % Drop ophthalmic solution Generic drug:  brimonidine-timolol * glucose blood VI test strips strip Commonly known as:  blood glucose test  
Test 4-5 times per day as needed * glucose blood VI test strips strip Commonly known as:  ONETOUCH ULTRA TEST  
by Does Not Apply route See Admin Instructions. Dx. Code E11.9  
  
 * ONETOUCH ULTRA TEST strip Generic drug:  glucose blood VI test strips TEST FOUR TIMES DAILY AS DIRECTED Marielena Pen Needle 32 gauge x 5/32\" Ndle Generic drug:  Insulin Needles (Disposable) NovoLOG Mix 70-30FlexPen U-100 100 unit/mL (70-30) Inpn Generic drug:  insulin aspart protamine/insulin aspart  
  
 quinapril 40 mg tablet Commonly known as:  ACCUPRIL  
TAKE 2 TABLETS BY MOUTH EVERY DAY  
  
 tadalafil 20 mg tablet Commonly known as:  CIALIS Take 1 Tab by mouth as needed. triamcinolone acetonide 0.1 % topical cream  
Commonly known as:  KENALOG Apply  to affected area two (2) times a day. use thin layer * Notice: This list has 3 medication(s) that are the same as other medications prescribed for you. Read the directions carefully, and ask your doctor or other care provider to review them with you. We Performed the Following AMB POC GLUCOSE, QUANTITATIVE, BLOOD [67915 CPT(R)] CBC WITH AUTOMATED DIFF [16231 CPT(R)] METABOLIC PANEL, COMPREHENSIVE [73569 CPT(R)] Follow-up Instructions Return in about 3 months (around 8/17/2018). Introducing [facility]! [employer] introduces [employer] patient portal. Now you can access parts of your medical record, email your doctor's office, and request medication refills online. 1. In your internet browser, go to [email or web address] 2. Click on the First Time User? Click Here link in the Sign In box. You will see the New Member Sign Up page. 3. Enter your Pix[employer] Access Code exactly as it appears below. You will not need to use this code after youve completed the sign-up process. If you do not sign up before the expiration date, you must request a new code. · [employer] Access Code: [email or web address] Expires: 7/10/2018 11:41 PM 
 
4. Enter the last four digits of your Social Security Number (xxxx) and Date of Birth (mm/dd/yyyy) as indicated and click Submit. You will be taken to the next sign-up page. 5. Create a Pixowl ID. This will be your Pix[employer] login ID and cannot be changed, so think of one that is secure and easy to remember. 6. Create a [employer] password. You can change your password at any time. 7. Enter your Password Reset Question and Answer. This can be used at a later time if you forget your password. 8. Enter your e-mail address. You will receive e-mail notification when new information is available in [address] 9. Click Sign Up. You can now view and download portions of your medical record. 10. Click the Download Summary menu link to download a portable copy of your medical information. If you have questions, please visit the Frequently Asked Questions section of the [employer] website. Remember, [employer]owl is NOT to be used for urgent needs. For medical emergencies, dial 911. Now available from your iPhone and Android! Please provide this summary of care documentation to your next provider. Your primary care clinician is listed as Serge Christopher. If you have any questions after today's visit, please call [phone].